# Patient Record
Sex: FEMALE
[De-identification: names, ages, dates, MRNs, and addresses within clinical notes are randomized per-mention and may not be internally consistent; named-entity substitution may affect disease eponyms.]

---

## 2022-04-25 ENCOUNTER — P2P PATIENT RECORD (OUTPATIENT)
Age: 47
End: 2022-04-25

## 2022-04-27 ENCOUNTER — TELEPHONE ENCOUNTER (OUTPATIENT)
Dept: URBAN - METROPOLITAN AREA CLINIC 86 | Facility: CLINIC | Age: 47
End: 2022-04-27

## 2022-05-05 ENCOUNTER — WEB ENCOUNTER (OUTPATIENT)
Dept: URBAN - METROPOLITAN AREA CLINIC 86 | Facility: CLINIC | Age: 47
End: 2022-05-05

## 2022-05-08 ENCOUNTER — TELEPHONE ENCOUNTER (OUTPATIENT)
Dept: URBAN - METROPOLITAN AREA CLINIC 92 | Facility: CLINIC | Age: 47
End: 2022-05-08

## 2022-05-08 NOTE — HPI-TODAY'S VISIT:
Patient is a 46-year-old female being referred by Dr. Shaun Alonzo for evaluation of abnormal immunologic markers specifically smooth muscle antibody positive study. A copy of the note will be sent to referring provider. Patient listed as being noted to have a history of goiter diagnosed in her 20s and being treated medications.  Noted to have now prediabetes with the last 2 years A1c at threshold of diabetes.  Polycystic ovary noted in the 20s.  Fatty liver noted in 2021.  Has been gaining weight for the last 5 years and has increased from 130-1 150-180 in 2017.  Peaking at 215 which is her current weight.  Has failed other efforts to lose weight.   Noted to have high liver labs and indeterminate fib 4 score.  AST listed as 151  platelets 345. A1c less than 5.7%. Also elicited with polycystic ovarian issues. 2022 labs showed ERNESTO speckled at a level of 160 homogeneous.  Prolactin normal at 18.1.  TSH 2.62.  DHEA morning.  Alpha-1 140..  CPK 80 683 inflammation grade of 0.67.  Suggestive of severe activity.  Cholesterol 211 triglyceride 84 HDL 57 glucose 111 BUN of 12 Kren 0.4 sodium 139 potassium 4.6 calcium 9.6 albumin 4.5 globin 0.6 alkaline phosphatase 65   F-actin +37.8 ASMA level of 20.  Vitamin D 35.  Hep B core total negative.  Testosterone 37.  AMA negative at 2.7.  b12 slightly up at 951.  Folate normal 10.9 uric acid 5.2.  Hep C antibody negative.  White cell count 9.9 hemoglobin 13.6 platelet count 391 hemoglobin A1c 6.0.  B surface antigen negative.  Ferritin 114.  IgG normal at 1245 IgM normal at 68 IgA slightly up at 578.  This would suggest this is a low positive immune marker due to the fatty liver and not primary immune issues. Hep B immunity seen but level not sent. Patient listed on a recent visit of 2022 as having a weight of 218.6 height of five-point 5-1/4 and a BMI of 36.1. Allergies listed as none.  Medicines include ibuprofen 800 mg up to every 8 hours as needed, B complex every day and vitamin D. Past history includes abnormal immune marker, sleep deprivation, simple goiter, suspected nonalcoholic fatty liver, prediabetes, hyperlipidemia, polycystic ovary syndrome, obesity, hirsutism, asthma, gluten sensitivity, and fatigue. Family history Father with glaucoma, macular degeneration, hyperlipidemia, hypertension.  Mother with congestive heart failure hyperlipidemia and hypertensive disorder. Social history patient does have approximately 5 drinks a week.  Not a smoker. Past surgical history  in 2008 and 2010.  1.  Abnormal liver labs.  Noted patient has gained significant mount of weight up to the highest weight yet.  He is prediabetic.  Has hyperlipidemia.  Has polycystic ovary which is an important risk factor for worsening fatty liver risk and Rosario as well.  Patient needs to work on each individual risk factor.  She is working out with Dr. Alonzo to try to address these factors and and see helps with both her polycystic ovary, the prediabetes and issues we will move forward in the right direction.   2.  Fatty liver suspected and needs imaging to reassess medications that could work for the same and Monitor accordingly.  Addressing nutritional risk factors will help prevent progression of disease.  Estimated by serologic score to have high activity and could have high risk to progress to fibrosis over time if not able to be addressed. 3.  Polycystic ovary syndrome noted.  Increases risk to have ROSARIO estimated at 3-5 fold and sometimes higher.  Needs to work on addressing the activity as is estimated to be high and has a high risk to progress to fibrosis of estimated 30% in 4 months working on same. 4.  Obesity noted needs to work on same.  May be difficult unless she is able to dress and other predisposing factors adding to this.  Losing even 5 to 10% of her overall health.  Exercise even walking 30 minutes a day can be helpful. 5.  Hyperlipidemia also needs to be addressed as this will also help with the fatty liver. 6.  Prediabetes again added factor to the same measures and all related to metabolic syndrome and insulin resistance.  These factors address should help with this as well. 7.  Asthma history noted as per team. 8.  Gluten sensitivity reported. 9.  Sleep deprivation history noted. 10.  Hirsutism being worked up by Dr. Alonzo. 11.  Abnormal immunologic markers likely related to false positive as IgG and IgM are normal.  Can be seen up to 40% of patients with fatty liver.  Plan 1.  Agree with Dr. Radford that the patient needs to look at all risk factors that she has not tried to address them. 2.  It is known the polycystic ovary patients have much higher risk for fatty liver. 3.  A combined approach of treating her prediabetes, polycystic ovary, weight loss as possible of even 5 to 10%, exercise daily, and healthy diet can all combine work on these issues. 4.  Emerging new classes of medicines, both the diabetes and prediabetes and the ROSARIO risk factors that could potentially be looked at both now and in the future is released. 5. Need liver imaging to assess status and correlate with serologic tests.  Stressed to pt the need for social distancing and strict handwashing and wearing a mask and to follow any other new or added CDC recommendations as this is an evolving target. Duration of the visit was 0 minutes with 20 minutes of chart prep and 20 minutes by dox video and then by phone due to internet issues by phone for this TeleMed visit with time reviewing their prior and recent records and labs and discussing their current status and future plans for care for the patient.

## 2022-05-12 ENCOUNTER — WEB ENCOUNTER (OUTPATIENT)
Dept: URBAN - METROPOLITAN AREA CLINIC 86 | Facility: CLINIC | Age: 47
End: 2022-05-12

## 2022-05-16 ENCOUNTER — LAB OUTSIDE AN ENCOUNTER (OUTPATIENT)
Dept: URBAN - METROPOLITAN AREA CLINIC 86 | Facility: CLINIC | Age: 47
End: 2022-05-16

## 2022-05-16 ENCOUNTER — OFFICE VISIT (OUTPATIENT)
Dept: URBAN - METROPOLITAN AREA CLINIC 86 | Facility: CLINIC | Age: 47
End: 2022-05-16
Payer: COMMERCIAL

## 2022-05-16 ENCOUNTER — WEB ENCOUNTER (OUTPATIENT)
Dept: URBAN - METROPOLITAN AREA CLINIC 86 | Facility: CLINIC | Age: 47
End: 2022-05-16

## 2022-05-16 DIAGNOSIS — Z71.89 VACCINE COUNSELING: ICD-10-CM

## 2022-05-16 DIAGNOSIS — E78.5 HYPERLIPIDEMIA: ICD-10-CM

## 2022-05-16 DIAGNOSIS — Z79.899 HIGH RISK MEDICATION USE: ICD-10-CM

## 2022-05-16 DIAGNOSIS — K76.0 FATTY LIVER: ICD-10-CM

## 2022-05-16 DIAGNOSIS — E66.9 OBESITY: ICD-10-CM

## 2022-05-16 DIAGNOSIS — K90.41 GLUTEN SENSITIVITY: ICD-10-CM

## 2022-05-16 DIAGNOSIS — J45.909 ASTHMA: ICD-10-CM

## 2022-05-16 DIAGNOSIS — R76.9 ABNORMAL IMMUNOLOGICAL FINDING IN SERUM: ICD-10-CM

## 2022-05-16 DIAGNOSIS — E04.0 SIMPLE GOITER: ICD-10-CM

## 2022-05-16 DIAGNOSIS — R73.03 PREDIABETES: ICD-10-CM

## 2022-05-16 DIAGNOSIS — E28.2 PCOS (POLYCYSTIC OVARIAN SYNDROME): ICD-10-CM

## 2022-05-16 DIAGNOSIS — R74.8 ABNORMAL LIVER ENZYMES: ICD-10-CM

## 2022-05-16 PROBLEM — 399939002 HIRSUTISM: Status: ACTIVE | Noted: 2022-05-08

## 2022-05-16 PROCEDURE — 99204 OFFICE O/P NEW MOD 45 MIN: CPT

## 2022-05-16 PROCEDURE — 99244 OFF/OP CNSLTJ NEW/EST MOD 40: CPT

## 2022-05-16 RX ORDER — B-COMPLEX WITH VITAMIN C
AS DIRECTED TABLET ORAL
Status: ACTIVE | COMMUNITY

## 2022-05-16 RX ORDER — IBUPROFEN 800 MG/1
1 TABLET WITH FOOD OR MILK AS NEEDED TABLET ORAL
Status: ACTIVE | COMMUNITY

## 2022-05-16 RX ORDER — CHOLECALCIFEROL (VITAMIN D3) 50 MCG
1 TABLET TABLET ORAL ONCE A DAY
Status: ACTIVE | COMMUNITY

## 2022-05-16 NOTE — HPI-TODAY'S VISIT:
Patient is a 46-year-old female being referred by Dr. Shaun Alonzo for evaluation of abnormal immunologic markers specifically smooth muscle antibody positive study in pt with suspected fatty liver and pcos.  A copy of the note will be sent to referring provider.  PCOS is a big risk factor for chen and for more aggressive chen.  Reviewed the notes: Patient listed as being noted to have a history of goiter diagnosed in her 20s and being treated medications.   Noted to have now prediabetes with the last 2 years A1c at threshold of diabetes.    Polycystic ovary noted in the 20s.    Fatty liver noted in 2021.    Has been gaining weight for the last 5 years and has increased from 130-150 to 180 in 2017.  Peaking at 215 which is her current weight with endocrine and 218 here  Had failed other efforts to lose weight.    Noted to have high liver labs and indeterminate fib 4 score.    AST listed as 151  platelets 345.  A1c less than 5.7%.  Also with polycystic ovarian issues and not on tx and she had this with getting pregnancy.  She had 2 kids and last one in . dropped to 150 after and then took off 2017 to now.  2022 labs showed ERNESTO speckled at a level of 160 homogeneous.  Prolactin normal at 18.1.  TSH 2.62.  DHEA morning.  Alpha-1 140.  CPK 80.  Serologic assay  inflammation grade of 0.67.  Suggestive of severe activity.   Cholesterol 211 triglyceride 84 HDL 57 glucose 111 BUN of 12 cr 0.4 sodium 139 potassium 4.6 calcium 9.6 albumin 4.5 globin 0.6 alkaline phosphatase 65   F-actin +37.8 ASMA level of 20.  Vitamin D 35.  Hep B core total negative.  Testosterone 37.  AMA negative at 2.7.  b12 slightly up at 951.  Folate normal 10.9 uric acid 5.2.  Hep C antibody negative.  White blood cell count 9.9 hemoglobin 13.6 platelet count 391 hemoglobin A1c 6.0.  B surface antigen negative.  Ferritin 114.  IgG normal at 1245 IgM normal at 68 IgA slightly up at 578.  This would suggest this is a low positive immune marker due to the fatty liver and not primary immune issues.  Hep B immunity seen but level not sent.  Patient listed on a recent visit of 2022 as having a weight of 218.6 height of five-point 5-1/4 and a BMI of 36.1.  Allergies listed as none.    Medicines include ibuprofen 800 mg up to every 8 hours as needed, B complex every day and vitamin D.  Not on any meds yet.  Past history includes abnormal immune marker, sleep deprivation, simple goiter, suspected nonalcoholic fatty liver, prediabetes, hyperlipidemia, polycystic ovary syndrome, obesity, hirsutism, asthma, gluten sensitivity, and fatigue.  Family history Father with glaucoma, macular degeneration, hyperlipidemia, hypertension.  Mother with congestive heart failure hyperlipidemia and hypertensive disorder.  Social history patient does have approximately 1-2 drinks a week and stopped.  Not a smoker.  2 kids.  Past surgical history  in 2008 and 2010.  Pt is on exercise and she says she walks 30 -40 min of walking.  Diet wise she is daily harvest and does smoothies and dinner with them.  Gave info re chen and med diets.  May need to get dietician.  Plan 1.  Agree with Dr. Radford that the patient needs to look at all risk factors that she has not tried to address them. 2.  It is known the polycystic ovary patients have much higher risk for fatty liver. 3.  A combined approach of treating her prediabetes, polycystic ovary, weight loss as possible of even 5 to 10%, staying exercise daily, and healthy diet can all combine work on these issues. 4.  Emerging new classes of medicines, both the diabetes and prediabetes and the CHEN risk factors that could potentially be looked at both now and in the future is released. 5. Need liver imaging to assess status and correlate with serologic tests. 6, gave her info to read re chen and med diket and pcos and liver.   Stressed to pt the need for social distancing and strict handwashing and wearing a mask and to follow any other new or added CDC recommendations as this is an evolving target.  Duration of the visit was 55 minutes with 25 minutes of chart prep and 30 minutes for the face to face visit with time reviewing their prior and recent records and labs and discussing their current status and future plans for care for the patient.

## 2022-05-16 NOTE — EXAM-PHYSICAL EXAM
Gen: awake and responsive. Eyes: anicteric, normal lids. Mouth: covered with mask. Nose: covered with mask. Hearing: intact grossly. Neck: trachea midline and no jvd. CV: RRR no s3. Lungs: clear. No wheezes, Abd: Soft, nabs, nr, NT. Not able to feel liver or spleen. Ext: no sig edema, slight palm erythema. Neuro: moves all 4 ext grossly. No asterixis. Skin: mentions some foot pruritis and slight palm erythema.

## 2022-05-17 ENCOUNTER — TELEPHONE ENCOUNTER (OUTPATIENT)
Dept: URBAN - METROPOLITAN AREA CLINIC 92 | Facility: CLINIC | Age: 47
End: 2022-05-17

## 2022-05-17 LAB
ALBUMIN: 4.3
ALKALINE PHOSPHATASE: 61
ALPHA-1-ANTITRYPSIN, SERUM: 147
ALT (SGPT): 66
AST (SGOT): 39
BILIRUBIN, DIRECT: 0.12
BILIRUBIN, TOTAL: 0.4
CERULOPLASMIN: 28.5
HBSAG SCREEN: NEGATIVE
HCV AB: <0.1
HEP A AB, TOTAL: NEGATIVE
HEP B CORE AB, TOT: NEGATIVE
HEPATITIS B SURF AB QUANT: 23.3
INTERPRETATION:: (no result)
IRON BIND.CAP.(TIBC): 331
IRON SATURATION: 16
IRON: 52
PROTEIN, TOTAL: 7.2
UIBC: 279

## 2022-05-17 NOTE — HPI-TODAY'S VISIT:
Dear Maribell Marinelli, May 16 labs show alpha-1 level normal at 147.  Ceruloplasmin normal at 28.5.  Hep C antibody negative at less than 0.1.   Hep A immunity not seen.  Hep B core total negative.  Hep B immunity was seen at 23.3. Hep B surface antigen negative.  Albumin 4.3 bilirubin 0.4 direct bilirubin 0.2 alkaline phosphatase 61. AST 39 and ALT 66 with ideal ALT less than 25.  Iron saturation normal at 16%. In summary, these laboratories did not find any additional issues but it does suggest that you need to get the hep A vaccine series.   The labs seen are consistent with a fatty liver. Very important as we discussed to see if Dr. Alonzo can find a medicine to help you with your prediabetic state that also helps with the fatty liver as many of these medicines again are emerging including newer ones that we will have specific indications for this in the next 1 to 2 years.   In the interim we need to continue your other efforts which you mentioned you were doing already. Hopefully we can find some additional options and ways to augment those efforts. Dr. Yanez

## 2022-06-15 ENCOUNTER — TELEPHONE ENCOUNTER (OUTPATIENT)
Dept: URBAN - METROPOLITAN AREA CLINIC 92 | Facility: CLINIC | Age: 47
End: 2022-06-15

## 2022-06-15 NOTE — HPI-TODAY'S VISIT:
Brian Marinelli, Elisa 15 ultrasound shows diffuse parenchymal echogenicity changes of liver but with no lesions. This would support a fatty liver as being likely present. No dilated bile ducts seen in common bile duct normal at 2 mm. Gallbladder was normal. Pancreas were visualized was unremarkable. Right kidney 12 cm left kidney 12.3 cm with no hydronephrosis. Spleen normal 11.3 cm. Liver vessels patent. Dr Yanez

## 2022-06-20 ENCOUNTER — TELEPHONE ENCOUNTER (OUTPATIENT)
Dept: URBAN - METROPOLITAN AREA CLINIC 86 | Facility: CLINIC | Age: 47
End: 2022-06-20

## 2022-07-15 ENCOUNTER — OFFICE VISIT (OUTPATIENT)
Dept: URBAN - METROPOLITAN AREA CLINIC 86 | Facility: CLINIC | Age: 47
End: 2022-07-15

## 2022-07-15 NOTE — HPI-TODAY'S VISIT:
Patient is a 46-year-old female last seen may 2022 and referred by Dr. Shaun Alonzo for evaluation of abnormal immunologic markers specifically smooth muscle antibody positive study in pt with suspected fatty liver and pcos.  A copy of the note will be sent to referring provider.  Dear Roya Marinelli, Elisa 15 ultrasound shows diffuse parenchymal echogenicity changes of liver but with no lesions. This would support a fatty liver as being likely present. No dilated bile ducts seen in common bile duct normal at 2 mm. Gallbladder was normal. Pancreas were visualized was unremarkable. Right kidney 12 cm left kidney 12.3 cm with no hydronephrosis. Spleen normal 11.3 cm. Liver vessels patent. Dr Yanez Dear Maribell Marinelli, May 16 labs show alpha-1 level normal at 147.  Ceruloplasmin normal at 28.5.  Hep C antibody negative at less than 0.1.   Hep A immunity not seen.  Hep B core total negative.  Hep B immunity was seen at 23.3. Hep B surface antigen negative.  Albumin 4.3 bilirubin 0.4 direct bilirubin 0.2 alkaline phosphatase 61. AST 39 and ALT 66 with ideal ALT less than 25.  Iron saturation normal at 16%. In summary, these laboratories did not find any additional issues but it does suggest that you need to get the hep A vaccine series.   The labs seen are consistent with a fatty liver. Very important as we discussed to see if Dr. Alonzo can find a medicine to help you with your prediabetic state that also helps with the fatty liver as many of these medicines again are emerging including newer ones that we will have specific indications for this in the next 1 to 2 years.   In the interim we need to continue your other efforts which you mentioned you were doing already. Hopefully we can find some additional options and ways to augment those efforts. Dr. Yanez  PCOS is a big risk factor for chen and for more aggressive chen.  Reviewed the notes: Patient listed as being noted to have a history of goiter diagnosed in her 20s and being treated medications.   Noted to have now prediabetes with the last 2 years A1c at threshold of diabetes.    Polycystic ovary noted in the 20s.    Fatty liver noted in 2021.    Has been gaining weight for the last 5 years and has increased from 130-150 to 180 in 2017.  Peaking at 215 which is her current weight with endocrine and 218 here  Had failed other efforts to lose weight.    Noted to have high liver labs and indeterminate fib 4 score.    AST listed as 151  platelets 345.  A1c less than 5.7%.  Also with polycystic ovarian issues and not on tx and she had this with getting pregnancy.  She had 2 kids and last one in . dropped to 150 after and then took off 2017 to now.  2022 labs showed ERNESTO speckled at a level of 160 homogeneous.  Prolactin normal at 18.1.  TSH 2.62.  DHEA morning.  Alpha-1 140.  CPK 80.  Serologic assay  inflammation grade of 0.67.  Suggestive of severe activity.   Cholesterol 211 triglyceride 84 HDL 57 glucose 111 BUN of 12 cr 0.4 sodium 139 potassium 4.6 calcium 9.6 albumin 4.5 globin 0.6 alkaline phosphatase 65   F-actin +37.8 ASMA level of 20.  Vitamin D 35.  Hep B core total negative.  Testosterone 37.  AMA negative at 2.7.  b12 slightly up at 951.  Folate normal 10.9 uric acid 5.2.  Hep C antibody negative.  White blood cell count 9.9 hemoglobin 13.6 platelet count 391 hemoglobin A1c 6.0.  B surface antigen negative.  Ferritin 114.  IgG normal at 1245 IgM normal at 68 IgA slightly up at 578.  This would suggest this is a low positive immune marker due to the fatty liver and not primary immune issues.  Hep B immunity seen but level not sent.  Patient listed on a recent visit of 2022 as having a weight of 218.6 height of five-point 5-1/4 and a BMI of 36.1.  Allergies listed as none.    Medicines include ibuprofen 800 mg up to every 8 hours as needed, B complex every day and vitamin D.  Not on any meds yet.  Past history includes abnormal immune marker, sleep deprivation, simple goiter, suspected nonalcoholic fatty liver, prediabetes, hyperlipidemia, polycystic ovary syndrome, obesity, hirsutism, asthma, gluten sensitivity, and fatigue.  Family history Father with glaucoma, macular degeneration, hyperlipidemia, hypertension.  Mother with congestive heart failure hyperlipidemia and hypertensive disorder.  Social history patient does have approximately 1-2 drinks a week and stopped.  Not a smoker.  2 kids.  Past surgical history  in 2008 and 2010.  Pt is on exercise and she says she walks 30 -40 min of walking.  Diet wise she is daily harvest and does smoothies and dinner with them.  Gave info re chen and med diets.  May need to get dietician.  Plan 1.  Agree with Dr. Radford that the patient needs to look at all risk factors that she has not tried to address them. 2.  It is known the polycystic ovary patients have much higher risk for fatty liver. 3.  A combined approach of treating her prediabetes, polycystic ovary, weight loss as possible of even 5 to 10%, staying exercise daily, and healthy diet can all combine work on these issues. 4.  Emerging new classes of medicines, both the diabetes and prediabetes and the CHEN risk factors that could potentially be looked at both now and in the future is released. 5. Need liver imaging to assess status and correlate with serologic tests. 6, gave her info to read re chen and med diket and pcos and liver.   Stressed to pt the need for social distancing and strict handwashing and wearing a mask and to follow any other new or added CDC recommendations as this is an evolving target.  Duration of the visit was  minutes with 25 minutes of chart prep and 30 minutes for the face to face visit with time reviewing their prior and recent records and labs and discussing their current status and future plans for care for the patient.

## 2022-07-16 ENCOUNTER — LAB OUTSIDE AN ENCOUNTER (OUTPATIENT)
Dept: URBAN - METROPOLITAN AREA CLINIC 86 | Facility: CLINIC | Age: 47
End: 2022-07-16

## 2022-07-31 PROBLEM — 441831003 GLUTEN SENSITIVITY: Status: ACTIVE | Noted: 2022-05-08

## 2022-07-31 PROBLEM — 195967001 ASTHMA: Status: ACTIVE | Noted: 2022-05-08

## 2022-07-31 PROBLEM — 267369002 SIMPLE GOITER: Status: ACTIVE | Noted: 2022-05-08

## 2022-08-10 ENCOUNTER — WEB ENCOUNTER (OUTPATIENT)
Dept: URBAN - METROPOLITAN AREA CLINIC 86 | Facility: CLINIC | Age: 47
End: 2022-08-10

## 2022-08-15 ENCOUNTER — LAB OUTSIDE AN ENCOUNTER (OUTPATIENT)
Dept: URBAN - METROPOLITAN AREA CLINIC 86 | Facility: CLINIC | Age: 47
End: 2022-08-15

## 2022-08-16 ENCOUNTER — CLAIMS CREATED FROM THE CLAIM WINDOW (OUTPATIENT)
Dept: URBAN - METROPOLITAN AREA CLINIC 86 | Facility: CLINIC | Age: 47
End: 2022-08-16
Payer: COMMERCIAL

## 2022-08-16 ENCOUNTER — TELEPHONE ENCOUNTER (OUTPATIENT)
Dept: URBAN - METROPOLITAN AREA CLINIC 92 | Facility: CLINIC | Age: 47
End: 2022-08-16

## 2022-08-16 VITALS
WEIGHT: 198 LBS | DIASTOLIC BLOOD PRESSURE: 78 MMHG | TEMPERATURE: 96.9 F | HEIGHT: 65 IN | BODY MASS INDEX: 32.99 KG/M2 | HEART RATE: 91 BPM | SYSTOLIC BLOOD PRESSURE: 117 MMHG

## 2022-08-16 DIAGNOSIS — E28.2 PCOS (POLYCYSTIC OVARIAN SYNDROME): ICD-10-CM

## 2022-08-16 DIAGNOSIS — Z72.820 SLEEP DEPRIVATION: ICD-10-CM

## 2022-08-16 DIAGNOSIS — E66.9 OBESITY: ICD-10-CM

## 2022-08-16 DIAGNOSIS — K76.0 FATTY LIVER: ICD-10-CM

## 2022-08-16 DIAGNOSIS — Z79.899 HIGH RISK MEDICATION USE: ICD-10-CM

## 2022-08-16 DIAGNOSIS — R76.9 ABNORMAL IMMUNOLOGICAL FINDING IN SERUM: ICD-10-CM

## 2022-08-16 DIAGNOSIS — Z71.89 VACCINE COUNSELING: ICD-10-CM

## 2022-08-16 DIAGNOSIS — R74.8 ABNORMAL LIVER ENZYMES: ICD-10-CM

## 2022-08-16 DIAGNOSIS — R73.03 PREDIABETES: ICD-10-CM

## 2022-08-16 DIAGNOSIS — E78.5 HYPERLIPIDEMIA: ICD-10-CM

## 2022-08-16 LAB
ALBUMIN: 4.7
ALKALINE PHOSPHATASE: 54
ALT (SGPT): 32
AST (SGOT): 23
BILIRUBIN, DIRECT: 0.12
BILIRUBIN, TOTAL: 0.6
PROTEIN, TOTAL: 7.3

## 2022-08-16 PROCEDURE — 99214 OFFICE O/P EST MOD 30 MIN: CPT | Performed by: PHYSICIAN ASSISTANT

## 2022-08-16 PROCEDURE — 99214 OFFICE O/P EST MOD 30 MIN: CPT

## 2022-08-16 RX ORDER — CHOLECALCIFEROL (VITAMIN D3) 50 MCG
1 TABLET TABLET ORAL ONCE A DAY
Status: ACTIVE | COMMUNITY

## 2022-08-16 RX ORDER — IBUPROFEN 800 MG/1
1 TABLET WITH FOOD OR MILK AS NEEDED TABLET ORAL
Status: ACTIVE | COMMUNITY

## 2022-08-16 RX ORDER — B-COMPLEX WITH VITAMIN C
AS DIRECTED TABLET ORAL
Status: ACTIVE | COMMUNITY

## 2022-08-16 NOTE — PHYSICAL EXAM HENT:
Head,  normocephalic,  atraumatic,  Face,  Face within normal limits,  Ears,  External ears within normal limits,  Nose/Nasopharynx and mouth: patient wearing mask
no

## 2022-08-16 NOTE — HPI-TODAY'S VISIT:
Patient is a 46-year-old female last seen may 2022 and referred by Dr. Shaun Alonzo for evaluation of abnormal immunologic markers specifically smooth muscle antibody positive study in pt with suspected fatty liver and pcos.  A copy of the note will be sent to referring provider.   office visit:   Elisa 15 ultrasound shows diffuse parenchymal echogenicity changes of liver but with no lesions. This would support a fatty liver as being likely present. No dilated bile ducts seen in common bile duct normal at 2 mm. Gallbladder was normal. Pancreas were visualized was unremarkable. Right kidney 12 cm left kidney 12.3 cm with no hydronephrosis. Spleen normal 11.3 cm. Liver vessels patent.  Still seeing Emperatriz. has lost 20 lbs on Saxenda. Encrouged.   Watching diet and exercise.  Dr. Awan did labs end of july and she said AST 21 and ALT 24 ,Alkaline phosphatase 52 She did labs for us yesterday through [facility]. Will try to get.   A1c: 5.6 in  prior to weight loss  Cholesterol: not checked recently. recommend getting at pcp   Sleep: sleeping much better.  RECAP previous visits:  May 16 labs show alpha-1 level normal at 147.  Ceruloplasmin normal at 28.5.  Hep C antibody negative at less than 0.1.   Hep A immunity not seen.  Hep B core total negative.  Hep B immunity was seen at 23.3. Hep B surface antigen negative.  Albumin 4.3 bilirubin 0.4 direct bilirubin 0.2 alkaline phosphatase 61. AST 39 and ALT 66 with ideal ALT less than 25.  Iron saturation normal at 16%.  Patient listed as being noted to have a history of goiter diagnosed in her 20s and being treated medications.   Noted to have now prediabetes with the last 2 years A1c at threshold of diabetes.    Polycystic ovary noted in the 20s.    Fatty liver noted in 2021.    Has been gaining weight for the last 5 years and has increased from 130-150 to 180 in 2017.  Peaking at 215 which is her current weight with endocrine and 218 here  Had failed other efforts to lose weight.    Noted to have high liver labs and indeterminate fib 4 score.    AST listed as 151  platelets 345.  A1c less than 5.7%.  Also with polycystic ovarian issues and not on tx and she had this with getting pregnancy.  She had 2 kids and last one in . dropped to 150 after and then took off 2017 to now.  2022 labs showed ERNESTO speckled at a level of 160 homogeneous.  Prolactin normal at 18.1.  TSH 2.62.  DHEA morning.  Alpha-1 140.  CPK 80.  Serologic assay  inflammation grade of 0.67.  Suggestive of severe activity.   Cholesterol 211 triglyceride 84 HDL 57 glucose 111 BUN of 12 cr 0.4 sodium 139 potassium 4.6 calcium 9.6 albumin 4.5 globin 0.6 alkaline phosphatase 65   F-actin +37.8 ASMA level of 20.  Vitamin D 35.  Hep B core total negative.  Testosterone 37.  AMA negative at 2.7.  b12 slightly up at 951.  Folate normal 10.9 uric acid 5.2.  Hep C antibody negative.  White blood cell count 9.9 hemoglobin 13.6 platelet count 391 hemoglobin A1c 6.0.  B surface antigen negative.  Ferritin 114.  IgG normal at 1245 IgM normal at 68 IgA slightly up at 578.  This would suggest this is a low positive immune marker due to the fatty liver and not primary immune issues.  Hep B immunity seen but level not sent.  Patient listed on a recent visit of 2022 as having a weight of 218.6 height of five-point 5-1/4 and a BMI of 36.1.  Allergies listed as none.    Medicines include ibuprofen 800 mg up to every 8 hours as needed, B complex every day and vitamin D.  Not on any meds yet.  Past history includes abnormal immune marker, sleep deprivation, simple goiter, suspected nonalcoholic fatty liver, prediabetes, hyperlipidemia, polycystic ovary syndrome, obesity, hirsutism, asthma, gluten sensitivity, and fatigue.  Family history Father with glaucoma, macular degeneration, hyperlipidemia, hypertension.  Mother with congestive heart failure hyperlipidemia and hypertensive disorder.  Social history patient does have approximately 1-2 drinks a week and stopped.  Not a smoker.  2 kids.  Past surgical history  in 2008 and 2010.  Pt is on exercise and she says she walks 30 -40 min of walking.  Diet wise she is daily harvest and does smoothies and dinner with them.  Gave info re chen and med diets.  May need to get dietician.

## 2022-08-16 NOTE — HPI-TODAY'S VISIT:
Dear Maribell Marinelli, Aug 15 labs show tp 7.3 and alb 4.7 and tb 0.6 and db 0.12 and alk 54 and ast 23 and alt 32. These are down from may 16: ast 39 and alt 66 so you are doing better! Ideal alt less than 25. Dr Yanez

## 2022-11-15 ENCOUNTER — LAB OUTSIDE AN ENCOUNTER (OUTPATIENT)
Dept: URBAN - METROPOLITAN AREA CLINIC 86 | Facility: CLINIC | Age: 47
End: 2022-11-15

## 2022-12-01 ENCOUNTER — LAB OUTSIDE AN ENCOUNTER (OUTPATIENT)
Dept: URBAN - METROPOLITAN AREA CLINIC 86 | Facility: CLINIC | Age: 47
End: 2022-12-01

## 2022-12-02 LAB
A/G RATIO: 1.7
ALBUMIN: 4.8
ALKALINE PHOSPHATASE: 50
ALT (SGPT): 11
AST (SGOT): 13
BILIRUBIN, TOTAL: 0.3
BUN/CREATININE RATIO: 6
BUN: 6
CALCIUM: 9.7
CARBON DIOXIDE, TOTAL: 23
CHLORIDE: 99
CREATININE: 0.96
EGFR: 74
GLOBULIN, TOTAL: 2.8
GLUCOSE: 94
POTASSIUM: 4.3
PROTEIN, TOTAL: 7.6
SODIUM: 138

## 2022-12-03 ENCOUNTER — TELEPHONE ENCOUNTER (OUTPATIENT)
Dept: URBAN - METROPOLITAN AREA CLINIC 92 | Facility: CLINIC | Age: 47
End: 2022-12-03

## 2022-12-03 NOTE — HPI-TODAY'S VISIT:
Brian Marinelli, December 2 ultrasound showed liver to have an increased echogenicity.  There were likely focal sparing areas of fatty liver around the gallbladder fossa suspected. Bile duct showed no dilation and common bile duct was normal at 2.5 mm. Gallbladder showed a 2 x 3 x 4 mm echogenic focus in the gallbladder neck.  Slater sign was negative. Pancreas was unremarkable were seen. Right kidney was 12.5 cm and left kidney 11.8 cm with no hydronephrosis. Spleen was 10.7 cm. Liver vessels were patent. Overall increased echogenicity of the liver was seen due to fatty liver. The portal vein demonstrated normal directional flow. A 4 mm echogenic area was seen in the gallbladder neck which they thought could represent a stone versus a polyp. Possible focal fat sparing was seen around the gallbladder fossa area. If one compares this ultrasound to your Elisa 15 ultrasound, it also showed diffuse parenchymal echogenic changes of the liver but no focal far sparing lesions were suggested.  It did not mention any focal fat sparing, I do believe that this would warrant us to consider an MRI to get a better look and to confirm this is the case.  I see that you have an upcoming appointment on December 7 with Jessica and at that point you and she can review your labs and this and if everything is stable plan to get an MRI to get a better look at the liver fat, and to confirm that this finding was just focal fat sparing as suspected by the radiologist. Dr. Yanez

## 2022-12-07 ENCOUNTER — LAB OUTSIDE AN ENCOUNTER (OUTPATIENT)
Dept: URBAN - METROPOLITAN AREA CLINIC 86 | Facility: CLINIC | Age: 47
End: 2022-12-07

## 2022-12-07 ENCOUNTER — CLAIMS CREATED FROM THE CLAIM WINDOW (OUTPATIENT)
Dept: URBAN - METROPOLITAN AREA CLINIC 86 | Facility: CLINIC | Age: 47
End: 2022-12-07
Payer: COMMERCIAL

## 2022-12-07 ENCOUNTER — WEB ENCOUNTER (OUTPATIENT)
Dept: URBAN - METROPOLITAN AREA CLINIC 86 | Facility: CLINIC | Age: 47
End: 2022-12-07

## 2022-12-07 VITALS
BODY MASS INDEX: 27.99 KG/M2 | WEIGHT: 168 LBS | HEIGHT: 65 IN | TEMPERATURE: 97 F | DIASTOLIC BLOOD PRESSURE: 69 MMHG | SYSTOLIC BLOOD PRESSURE: 98 MMHG | HEART RATE: 87 BPM

## 2022-12-07 DIAGNOSIS — R73.03 PREDIABETES: ICD-10-CM

## 2022-12-07 DIAGNOSIS — Z79.899 HIGH RISK MEDICATION USE: ICD-10-CM

## 2022-12-07 DIAGNOSIS — R93.5 ABNORMAL US (ULTRASOUND) OF ABDOMEN: ICD-10-CM

## 2022-12-07 DIAGNOSIS — E66.9 OBESITY: ICD-10-CM

## 2022-12-07 DIAGNOSIS — K76.0 FATTY LIVER: ICD-10-CM

## 2022-12-07 DIAGNOSIS — R76.9 ABNORMAL IMMUNOLOGICAL FINDING IN SERUM: ICD-10-CM

## 2022-12-07 DIAGNOSIS — E78.5 HYPERLIPIDEMIA: ICD-10-CM

## 2022-12-07 DIAGNOSIS — Z71.89 VACCINE COUNSELING: ICD-10-CM

## 2022-12-07 DIAGNOSIS — E28.2 PCOS (POLYCYSTIC OVARIAN SYNDROME): ICD-10-CM

## 2022-12-07 DIAGNOSIS — R74.8 ABNORMAL LIVER ENZYMES: ICD-10-CM

## 2022-12-07 DIAGNOSIS — Z72.820 SLEEP DEPRIVATION: ICD-10-CM

## 2022-12-07 PROBLEM — 409063005 COUNSELING: Status: ACTIVE | Noted: 2022-05-08

## 2022-12-07 PROBLEM — 161646004 H/O: HIGH RISK MEDICATION: Status: ACTIVE | Noted: 2022-05-08

## 2022-12-07 PROBLEM — 55822004 HYPERLIPIDEMIA: Status: ACTIVE | Noted: 2022-05-08

## 2022-12-07 PROBLEM — 15633921000119109: Status: ACTIVE | Noted: 2022-12-07

## 2022-12-07 PROBLEM — 69878008 POLYCYSTIC OVARIES: Status: ACTIVE | Noted: 2022-05-08

## 2022-12-07 PROBLEM — 130989002 SLEEP DEPRIVATION: Status: ACTIVE | Noted: 2022-05-08

## 2022-12-07 PROBLEM — 714628002 PREDIABETES: Status: ACTIVE | Noted: 2022-05-08

## 2022-12-07 PROBLEM — 414916001 OBESITY: Status: ACTIVE | Noted: 2022-05-08

## 2022-12-07 PROBLEM — 197321007 FATTY LIVER: Status: ACTIVE | Noted: 2022-05-08

## 2022-12-07 PROBLEM — 166643006 LIVER ENZYMES ABNORMAL: Status: ACTIVE | Noted: 2022-05-08

## 2022-12-07 PROCEDURE — 99214 OFFICE O/P EST MOD 30 MIN: CPT | Performed by: PHYSICIAN ASSISTANT

## 2022-12-07 RX ORDER — SEMAGLUTIDE 2.4 MG/.75ML
INJECT 2.4 MG EVERY WEEK BY SUBCUTANEOUS ROUTE INJECTION, SOLUTION SUBCUTANEOUS
Qty: 9 MILLILITER | Refills: 0 | Status: ACTIVE | COMMUNITY

## 2022-12-07 RX ORDER — SPIRONOLACTONE 50 MG/1
TABLET ORAL
Qty: 60 TABLET | Status: ACTIVE | COMMUNITY

## 2022-12-07 RX ORDER — URSODIOL 250 MG/1
TAKE 1 TABLET BY MOUTH TWICE A DAY TABLET ORAL
Qty: 180 EACH | Refills: 1 | Status: ACTIVE | COMMUNITY

## 2022-12-07 RX ORDER — B-COMPLEX WITH VITAMIN C
AS DIRECTED TABLET ORAL
Status: ACTIVE | COMMUNITY

## 2022-12-07 RX ORDER — CHOLECALCIFEROL (VITAMIN D3) 50 MCG
1 TABLET TABLET ORAL ONCE A DAY
Status: ACTIVE | COMMUNITY

## 2022-12-07 RX ORDER — IBUPROFEN 800 MG/1
1 TABLET WITH FOOD OR MILK AS NEEDED TABLET ORAL
Status: ON HOLD | COMMUNITY

## 2022-12-07 NOTE — HPI-TODAY'S VISIT:
Patient is a 46-year-old female last seen may 2022 and referred by Dr. Shaun Alonzo for evaluation of abnormal immunologic markers specifically smooth muscle antibody positive study in pt with suspected fatty liver and pcos.  A copy of the note will be sent to referring provider.  22 office visit  ultrasound showed liver to have an increased echogenicity.  There were likely focal sparing areas of fatty liver around the gallbladder fossa suspected. Bile duct showed no dilation and common bile duct was normal at 2.5 mm. Gallbladder showed a 2 x 3 x 4 mm echogenic focus in the gallbladder neck.  Slater sign was negative. Pancreas was unremarkable were seen. Right kidney was 12.5 cm and left kidney 11.8 cm with no hydronephrosis. Spleen was 10.7 cm. Liver vessels were patent. Overall increased echogenicity of the liver was seen due to fatty liver. The portal vein demonstrated normal directional flow. A 4 mm echogenic area was seen in the gallbladder neck which they thought could represent a stone versus a polyp. Possible focal fat sparing was seen around the gallbladder fossa area. If one compares this ultrasound to your Elisa 15 ultrasound, it also showed diffuse parenchymal echogenic changes of the liver but no focal far sparing lesions were suggested.  It did not mention any focal fat sparing, I do believe that this would warrant us to consider an MRI to get a better look and to confirm this is the case.  168 now and previously 198 and before that 220 and doing well more disipline and wathcing diet and less stress and more sleep  Labs ast 13, alt 11 and doing much better Patient is working dr awan on a monthly basis to help with the weight loss and is on ursodiol to help with the stones She is also on the wegovy with him and doing well  sleep was better too , not snoring now as well which is good to note   RECAP Elisa 15 ultrasound shows diffuse parenchymal echogenicity changes of liver but with no lesions. This would support a fatty liver as being likely present. No dilated bile ducts seen in common bile duct normal at 2 mm. Gallbladder was normal. Pancreas were visualized was unremarkable. Right kidney 12 cm left kidney 12.3 cm with no hydronephrosis. Spleen normal 11.3 cm. Liver vessels patent.  Still seeing Emperatriz. has lost 20 lbs on Saxenda. Encrouged.   Watching diet and exercise.  Dr. Awan did labs end of july and she said AST 21 and ALT 24 ,Alkaline phosphatase 52 She did labs for us yesterday through lab [facility]. Will try to get.   A1c: 5.6 in  prior to weight loss  Cholesterol: not checked recently. recommend getting at pcp   Sleep: sleeping much better.  RECAP previous visits:  May 16 labs show alpha-1 level normal at 147.  Ceruloplasmin normal at 28.5.  Hep C antibody negative at less than 0.1.   Hep A immunity not seen.  Hep B core total negative.  Hep B immunity was seen at 23.3. Hep B surface antigen negative.  Albumin 4.3 bilirubin 0.4 direct bilirubin 0.2 alkaline phosphatase 61. AST 39 and ALT 66 with ideal ALT less than 25.  Iron saturation normal at 16%.  Patient listed as being noted to have a history of goiter diagnosed in her 20s and being treated medications.   Noted to have now prediabetes with the last 2 years A1c at threshold of diabetes.    Polycystic ovary noted in the 20s.    Fatty liver noted in 2021.    Has been gaining weight for the last 5 years and has increased from 130-150 to 180 in 2017.  Peaking at 215 which is her current weight with endocrine and 218 here  Had failed other efforts to lose weight.    Noted to have high liver labs and indeterminate fib 4 score.    AST listed as 151  platelets 345.  A1c less than 5.7%.  Also with polycystic ovarian issues and not on tx and she had this with getting pregnancy.  She had 2 kids and last one in . dropped to 150 after and then took off 2017 to now.  2022 labs showed ERNESTO speckled at a level of 160 homogeneous.  Prolactin normal at 18.1.  TSH 2.62.  DHEA morning.  Alpha-1 140.  CPK 80.  Serologic assay  inflammation grade of 0.67.  Suggestive of severe activity.   Cholesterol 211 triglyceride 84 HDL 57 glucose 111 BUN of 12 cr 0.4 sodium 139 potassium 4.6 calcium 9.6 albumin 4.5 globin 0.6 alkaline phosphatase 65   F-actin +37.8 ASMA level of 20.  Vitamin D 35.  Hep B core total negative.  Testosterone 37.  AMA negative at 2.7.  b12 slightly up at 951.  Folate normal 10.9 uric acid 5.2.  Hep C antibody negative.  White blood cell count 9.9 hemoglobin 13.6 platelet count 391 hemoglobin A1c 6.0.  B surface antigen negative.  Ferritin 114.  IgG normal at 1245 IgM normal at 68 IgA slightly up at 578.  This would suggest this is a low positive immune marker due to the fatty liver and not primary immune issues.  Hep B immunity seen but level not sent.  Patient listed on a recent visit of 2022 as having a weight of 218.6 height of five-point 5-1/4 and a BMI of 36.1.  Allergies listed as none.    Medicines include ibuprofen 800 mg up to every 8 hours as needed, B complex every day and vitamin D.  Not on any meds yet.  Past history includes abnormal immune marker, sleep deprivation, simple goiter, suspected nonalcoholic fatty liver, prediabetes, hyperlipidemia, polycystic ovary syndrome, obesity, hirsutism, asthma, gluten sensitivity, and fatigue.  Family history Father with glaucoma, macular degeneration, hyperlipidemia, hypertension.  Mother with congestive heart failure hyperlipidemia and hypertensive disorder.  Social history patient does have approximately 1-2 drinks a week and stopped.  Not a smoker.  2 kids.  Past surgical history  in 2008 and 2010.  Pt is on exercise and she says she walks 30 -40 min of walking.  Diet wise she is daily harvest and does smoothies and dinner with them.  Gave info re chen and med diets.  May need to get dietician.

## 2022-12-15 ENCOUNTER — LAB OUTSIDE AN ENCOUNTER (OUTPATIENT)
Dept: URBAN - METROPOLITAN AREA CLINIC 86 | Facility: CLINIC | Age: 47
End: 2022-12-15

## 2023-02-01 ENCOUNTER — LAB OUTSIDE AN ENCOUNTER (OUTPATIENT)
Dept: URBAN - METROPOLITAN AREA CLINIC 86 | Facility: CLINIC | Age: 48
End: 2023-02-01

## 2023-02-03 ENCOUNTER — WEB ENCOUNTER (OUTPATIENT)
Dept: URBAN - METROPOLITAN AREA CLINIC 86 | Facility: CLINIC | Age: 48
End: 2023-02-03

## 2023-03-07 ENCOUNTER — LAB OUTSIDE AN ENCOUNTER (OUTPATIENT)
Dept: URBAN - METROPOLITAN AREA CLINIC 86 | Facility: CLINIC | Age: 48
End: 2023-03-07

## 2023-03-08 ENCOUNTER — TELEPHONE ENCOUNTER (OUTPATIENT)
Dept: URBAN - METROPOLITAN AREA CLINIC 86 | Facility: CLINIC | Age: 48
End: 2023-03-08

## 2023-03-08 LAB
A/G RATIO: 1.8
ALBUMIN: 4.7
ALKALINE PHOSPHATASE: 49
ALT (SGPT): 7
AST (SGOT): 13
BASO (ABSOLUTE): 0
BASOS: 1
BILIRUBIN, TOTAL: 0.6
BUN/CREATININE RATIO: 12
BUN: 10
CALCIUM: 9.6
CARBON DIOXIDE, TOTAL: 25
CHLORIDE: 100
CREATININE: 0.84
EGFR: 87
EOS (ABSOLUTE): 0.1
EOS: 1
GLOBULIN, TOTAL: 2.6
GLUCOSE: 87
HEMATOCRIT: 40.6
HEMATOLOGY COMMENTS:: (no result)
HEMOGLOBIN: 13.4
IMMATURE CELLS: (no result)
IMMATURE GRANS (ABS): 0
IMMATURE GRANULOCYTES: 0
LYMPHS (ABSOLUTE): 2.7
LYMPHS: 34
MCH: 29.8
MCHC: 33
MCV: 90
MONOCYTES(ABSOLUTE): 0.7
MONOCYTES: 9
NEUTROPHILS (ABSOLUTE): 4.4
NEUTROPHILS: 55
NRBC: (no result)
PLATELETS: 418
POTASSIUM: 4
PROTEIN, TOTAL: 7.3
RBC: 4.49
RDW: 12.2
SODIUM: 138
WBC: 7.9

## 2023-03-08 NOTE — HPI-TODAY'S VISIT:
Dear Maribell Marinelli,  The 3/7/23 labs were sent to me.  The complete blood count was normal.  The Alkaline phosphatase 49, ast 13, alt 7. Previously ast 13, alt 11. Glad to see the las are staying low and stable. The kidney function and electrolytes are normal.  Yenni Pierson PA-C

## 2023-03-13 ENCOUNTER — OFFICE VISIT (OUTPATIENT)
Dept: URBAN - METROPOLITAN AREA CLINIC 86 | Facility: CLINIC | Age: 48
End: 2023-03-13
Payer: COMMERCIAL

## 2023-03-13 VITALS
DIASTOLIC BLOOD PRESSURE: 72 MMHG | HEIGHT: 65 IN | HEART RATE: 78 BPM | TEMPERATURE: 97.2 F | BODY MASS INDEX: 26.74 KG/M2 | SYSTOLIC BLOOD PRESSURE: 102 MMHG | WEIGHT: 160.5 LBS

## 2023-03-13 DIAGNOSIS — R73.03 PREDIABETES: ICD-10-CM

## 2023-03-13 DIAGNOSIS — E28.2 PCOS (POLYCYSTIC OVARIAN SYNDROME): ICD-10-CM

## 2023-03-13 DIAGNOSIS — R93.5 ABNORMAL US (ULTRASOUND) OF ABDOMEN: ICD-10-CM

## 2023-03-13 DIAGNOSIS — Z72.820 SLEEP DEPRIVATION: ICD-10-CM

## 2023-03-13 DIAGNOSIS — R74.8 ABNORMAL LIVER ENZYMES: ICD-10-CM

## 2023-03-13 DIAGNOSIS — E66.9 OBESITY: ICD-10-CM

## 2023-03-13 DIAGNOSIS — K76.0 FATTY LIVER: ICD-10-CM

## 2023-03-13 DIAGNOSIS — Z79.899 HIGH RISK MEDICATION USE: ICD-10-CM

## 2023-03-13 DIAGNOSIS — R76.9 ABNORMAL IMMUNOLOGICAL FINDING IN SERUM: ICD-10-CM

## 2023-03-13 DIAGNOSIS — E78.5 HYPERLIPIDEMIA: ICD-10-CM

## 2023-03-13 DIAGNOSIS — Z71.89 VACCINE COUNSELING: ICD-10-CM

## 2023-03-13 PROCEDURE — 99214 OFFICE O/P EST MOD 30 MIN: CPT | Performed by: PHYSICIAN ASSISTANT

## 2023-03-13 RX ORDER — CHOLECALCIFEROL (VITAMIN D3) 50 MCG
1 TABLET TABLET ORAL ONCE A DAY
Status: ACTIVE | COMMUNITY

## 2023-03-13 RX ORDER — SEMAGLUTIDE 2.4 MG/.75ML
INJECT 2.4 MG EVERY WEEK BY SUBCUTANEOUS ROUTE INJECTION, SOLUTION SUBCUTANEOUS
Qty: 9 MILLILITER | Refills: 0 | Status: ACTIVE | COMMUNITY

## 2023-03-13 RX ORDER — B-COMPLEX WITH VITAMIN C
AS DIRECTED TABLET ORAL
Status: ACTIVE | COMMUNITY

## 2023-03-13 RX ORDER — IBUPROFEN 800 MG/1
1 TABLET WITH FOOD OR MILK AS NEEDED TABLET ORAL
Status: ON HOLD | COMMUNITY

## 2023-03-13 RX ORDER — SPIRONOLACTONE 50 MG/1
TABLET ORAL
Qty: 60 TABLET | Status: ACTIVE | COMMUNITY

## 2023-03-13 RX ORDER — URSODIOL 250 MG/1
TAKE 1 TABLET BY MOUTH TWICE A DAY TABLET ORAL
Qty: 180 EACH | Refills: 1 | Status: ACTIVE | COMMUNITY

## 2023-03-13 NOTE — PHYSICAL EXAM NEUROLOGIC:
short and long term memory intact  64 year old male h/o GI bleed, diverticulosis with colonoscopy and clipping in 2013 who currently presents with GI bleed s/p CTA of abdomen found to have small arterial bleeds in sigmoid colon and rectum.     PLAN:     Neurologic: AAO x4, mentating well; continue to monitor  Respiratory: Adequate oxygenation; saturating well on room air, will maintain SPO2 >92%  Cardiovascular: Adequately perfused. Hemodynamically stable. C/w IVF w/ LR @ 125 cc/hr.  Gastrointestinal/Nutrition: CTA showed arterial blushes in rectum and sigmoid colon. Patient to go for colonoscopy today.  Renal/Genitourinary: BUN/Cr stable. Monitor UOP. Ins and Outs. Hyponatremia 2/2 to GI losses - trend BMP. C/w IVF LR @125cc/hr.  Hematologic: H/H stable. Trend CBC, transfuse PRN for Hgb goal >7.0. Coags wnl. Mechanical VTE ppx in setting of active bleeding.   Infectious Disease: Afebrile. No leukocytosis. Monitor off abx for now.  Tubes/Lines/Drains: PIV  Endocrine: Blood glucose wnl. Monitor FS.  Disposition: SICU    --------------------------------------------------------------------------------------    Critical Care Diagnoses: Gastrointestinal bleeding, Hyponatremia 64 year old male h/o GI bleed, diverticulosis with colonoscopy and clipping in 2013 who currently presents with GI bleed s/p CTA of abdomen found to have small arterial bleeds in sigmoid colon and rectum.     PLAN:     Neurologic: AAO x4, mentating well; continue to monitor  Respiratory: Adequate oxygenation; saturating well on room air, will maintain SPO2 >92%  Cardiovascular: Adequately perfused. Hemodynamically stable. C/w IVF  Gastrointestinal/Nutrition: CTA showed arterial blushes in rectum and sigmoid colon. Patient to go for colonoscopy today.  Renal/Genitourinary: BUN/Cr stable. Monitor UOP. Ins and Outs. Hyponatremia 2/2 to GI losses - trend BMP. C/w IVF LR @125cc/hr.  Hematologic: H/H stable. Trend CBC, transfuse PRN for Hgb goal >7.0. Coags wnl. Mechanical VTE ppx in setting of active bleeding.   Infectious Disease: Afebrile. No leukocytosis. Monitor off abx for now.  Tubes/Lines/Drains: PIV  Endocrine: Blood glucose wnl. Monitor FS.  Disposition: SICU    --------------------------------------------------------------------------------------    Critical Care Diagnoses: Gastrointestinal bleeding, Hyponatremia

## 2023-03-13 NOTE — HPI-TODAY'S VISIT:
Patient is a 46-year-old female last seen may 2022 and referred by Dr. Shaun Alonzo for evaluation of abnormal immunologic markers specifically smooth muscle antibody positive study in pt with suspected fatty liver and pcos.  A copy of the note will be sent to referring provider. 3/13/23 The complete blood count was normal. The Alkaline phosphatase 49, ast 13, alt 7. Previously ast 13, alt 11. Glad to see the las are staying low and stable. 2023 MRI 23 MRI LIver with no lesions, mild fat. superimposed fat seen near pora hepatis. Fat quant 10%, Decompresssed GB. SPleen normal. Seperate origin of hepatic artery, originating from aorta. The impression: Mild fat, no lesion  She has lost 8 more lbs. doing wel, encouraged.    recap  ultrasound showed liver to have an increased echogenicity.  There were likely focal sparing areas of fatty liver around the gallbladder fossa suspected. Bile duct showed no dilation and common bile duct was normal at 2.5 mm. Gallbladder showed a 2 x 3 x 4 mm echogenic focus in the gallbladder neck.  Slater sign was negative. Pancreas was unremarkable were seen. Right kidney was 12.5 cm and left kidney 11.8 cm with no hydronephrosis. Spleen was 10.7 cm. Liver vessels were patent. Overall increased echogenicity of the liver was seen due to fatty liver. The portal vein demonstrated normal directional flow. A 4 mm echogenic area was seen in the gallbladder neck which they thought could represent a stone versus a polyp. Possible focal fat sparing was seen around the gallbladder fossa area. If one compares this ultrasound to your Elisa 15 ultrasound, it also showed diffuse parenchymal echogenic changes of the liver but no focal far sparing lesions were suggested.  It did not mention any focal fat sparing, I do believe that this would warrant us to consider an MRI to get a better look and to confirm this is the case.  168 now and previously 198 and before that 220 and doing well more disipline and wathcing diet and less stress and more sleep  Labs ast 13, alt 11 and doing much better Patient is working dr awan on a monthly basis to help with the weight loss and is on ursodiol to help with the stones She is also on the wegovy with him and doing well  sleep was better too , not snoring now as well which is good to note   RECAP Elisa 15 ultrasound shows diffuse parenchymal echogenicity changes of liver but with no lesions. This would support a fatty liver as being likely present. No dilated bile ducts seen in common bile duct normal at 2 mm. Gallbladder was normal. Pancreas were visualized was unremarkable. Right kidney 12 cm left kidney 12.3 cm with no hydronephrosis. Spleen normal 11.3 cm. Liver vessels patent.  Still seeing Emperatriz. has lost 20 lbs on Saxenda. Encrouged.   Watching diet and exercise.  Dr. Awan did labs end of july and she said AST 21 and ALT 24 ,Alkaline phosphatase 52 She did labs for us yesterday through [facility]. Will try to get.   A1c: 5.6 in  prior to weight loss  Cholesterol: not checked recently. recommend getting at pcp   Sleep: sleeping much better.  RECAP previous visits:  May 16 labs show alpha-1 level normal at 147.  Ceruloplasmin normal at 28.5.  Hep C antibody negative at less than 0.1.   Hep A immunity not seen.  Hep B core total negative.  Hep B immunity was seen at 23.3. Hep B surface antigen negative.  Albumin 4.3 bilirubin 0.4 direct bilirubin 0.2 alkaline phosphatase 61. AST 39 and ALT 66 with ideal ALT less than 25.  Iron saturation normal at 16%.  Patient listed as being noted to have a history of goiter diagnosed in her 20s and being treated medications.   Noted to have now prediabetes with the last 2 years A1c at threshold of diabetes.    Polycystic ovary noted in the 20s.    Fatty liver noted in 2021.    Has been gaining weight for the last 5 years and has increased from 130-150 to 180 in 2017.  Peaking at 215 which is her current weight with endocrine and 218 here  Had failed other efforts to lose weight.    Noted to have high liver labs and indeterminate fib 4 score.    AST listed as 151  platelets 345.  A1c less than 5.7%.  Also with polycystic ovarian issues and not on tx and she had this with getting pregnancy.  She had 2 kids and last one in . dropped to 150 after and then took off 2017 to now.  2022 labs showed ERNESTO speckled at a level of 160 homogeneous.  Prolactin normal at 18.1.  TSH 2.62.  DHEA morning.  Alpha-1 140.  CPK 80.  Serologic assay  inflammation grade of 0.67.  Suggestive of severe activity.   Cholesterol 211 triglyceride 84 HDL 57 glucose 111 BUN of 12 cr 0.4 sodium 139 potassium 4.6 calcium 9.6 albumin 4.5 globin 0.6 alkaline phosphatase 65   F-actin +37.8 ASMA level of 20.  Vitamin D 35.  Hep B core total negative.  Testosterone 37.  AMA negative at 2.7.  b12 slightly up at 951.  Folate normal 10.9 uric acid 5.2.  Hep C antibody negative.  White blood cell count 9.9 hemoglobin 13.6 platelet count 391 hemoglobin A1c 6.0.  B surface antigen negative.  Ferritin 114.  IgG normal at 1245 IgM normal at 68 IgA slightly up at 578.  This would suggest this is a low positive immune marker due to the fatty liver and not primary immune issues.  Hep B immunity seen but level not sent.  Patient listed on a recent visit of 2022 as having a weight of 218.6 height of five-point 5-1/4 and a BMI of 36.1.  Allergies listed as none.    Medicines include ibuprofen 800 mg up to every 8 hours as needed, B complex every day and vitamin D.  Not on any meds yet.  Past history includes abnormal immune marker, sleep deprivation, simple goiter, suspected nonalcoholic fatty liver, prediabetes, hyperlipidemia, polycystic ovary syndrome, obesity, hirsutism, asthma, gluten sensitivity, and fatigue.  Family history Father with glaucoma, macular degeneration, hyperlipidemia, hypertension.  Mother with congestive heart failure hyperlipidemia and hypertensive disorder.  Social history patient does have approximately 1-2 drinks a week and stopped.  Not a smoker.  2 kids.  Past surgical history  in 2008 and 2010.  Pt is on exercise and she says she walks 30 -40 min of walking.  Diet wise she is daily harvest and does smoothies and dinner with them.  Gave info re chen and med diets.  May need to get dietician.

## 2023-03-14 ENCOUNTER — OFFICE VISIT (OUTPATIENT)
Dept: URBAN - METROPOLITAN AREA CLINIC 86 | Facility: CLINIC | Age: 48
End: 2023-03-14

## 2023-07-13 ENCOUNTER — LAB OUTSIDE AN ENCOUNTER (OUTPATIENT)
Dept: URBAN - METROPOLITAN AREA CLINIC 86 | Facility: CLINIC | Age: 48
End: 2023-07-13

## 2023-07-28 ENCOUNTER — LAB OUTSIDE AN ENCOUNTER (OUTPATIENT)
Dept: URBAN - METROPOLITAN AREA CLINIC 86 | Facility: CLINIC | Age: 48
End: 2023-07-28

## 2023-08-01 ENCOUNTER — OFFICE VISIT (OUTPATIENT)
Dept: URBAN - METROPOLITAN AREA TELEHEALTH 2 | Facility: TELEHEALTH | Age: 48
End: 2023-08-01

## 2023-08-04 LAB
A/G RATIO: 1.7
ALBUMIN: 4.5
ALKALINE PHOSPHATASE: 47
ALT (SGPT): 7
AST (SGOT): 14
BASO (ABSOLUTE): 0
BASOS: 1
BILIRUBIN, TOTAL: 0.5
BUN/CREATININE RATIO: 10
BUN: 9
CALCIUM: 10.1
CARBON DIOXIDE, TOTAL: 25
CHLORIDE: 98
CREATININE: 0.89
EGFR: 81
EOS (ABSOLUTE): 0.2
EOS: 2
GLOBULIN, TOTAL: 2.6
GLUCOSE: 81
HEMATOCRIT: 39.9
HEMATOLOGY COMMENTS:: (no result)
HEMOGLOBIN: 13.3
IMMATURE CELLS: (no result)
IMMATURE GRANS (ABS): 0
IMMATURE GRANULOCYTES: 0
LYMPHS (ABSOLUTE): 2.7
LYMPHS: 32
MCH: 30.7
MCHC: 33.3
MCV: 92
MONOCYTES(ABSOLUTE): 0.6
MONOCYTES: 8
NEUTROPHILS (ABSOLUTE): 4.8
NEUTROPHILS: 57
NRBC: (no result)
PLATELETS: 343
POTASSIUM: 4.3
PROTEIN, TOTAL: 7.1
RBC: 4.33
RDW: 12.8
SODIUM: 136
WBC: 8.4

## 2023-08-09 ENCOUNTER — TELEPHONE ENCOUNTER (OUTPATIENT)
Dept: URBAN - METROPOLITAN AREA CLINIC 86 | Facility: CLINIC | Age: 48
End: 2023-08-09

## 2023-08-09 NOTE — HPI-TODAY'S VISIT:
Dear Maribell Marinelli,  The August 3 labs were sent to me.  Glucose 81, creatinine 0.89, sodium 136, potassium 4.3, bilirubin 0.5, alkaline phosphatase 47, AST 14, ALT 7.  White blood cells 8.4, red blood cells 4.3, hemoglobin 13.3, MCV 92, platelets 333 these are normal.  You also did an ultrasound on August 3 at [place] and the liver was normal in echogenicity and they did not see any lesions.  The common bile duct was 4 mm and this is normal.  The gallbladder unremarkable.  The right and left kidney appeared unremarkable.  Pancreas was normal were seen.  Spleen 9.1 cm and this is normal.  They thought the aorta was normal in caliber where it was seen.  The hepatic vasculature was patent.  Overall they noted that this was an unremarkable abdominal ultrasound.  If you recall the MRI done in January of this year thought the fat quantification was 10%.  You also had an ultrasound in December 2022 that showed increased echogenicity.  Now the ultrasound is not suggesting this.  This is good to see.  We will review this at the next visit but it may be a good idea to repeat the MRI as our next imaging to determine if the fat quantification has improved given the ultrasound is suggesting this.  Happy to see that the labs are normal and the ultrasound is looking better as well.  Hope you are well.  Yenni Pierson PA-C

## 2023-08-14 ENCOUNTER — TELEPHONE ENCOUNTER (OUTPATIENT)
Dept: URBAN - METROPOLITAN AREA CLINIC 86 | Facility: CLINIC | Age: 48
End: 2023-08-14

## 2023-08-14 NOTE — HPI-TODAY'S VISIT:
Dear Maribell Marinelli,  The recent August 3 ultrasound was sent to me.  The liver normal in echogenicity without lesions.  The bile ducts were not dilated.  The common duct is 4 mm and this is normal.  Pancreas was normal were seen.  The right and left kidneys appeared normal.  Spleen 9.1 cm and this is normal.  The hepatic vascular was patent.  Overall they felt this was a unremarkable abdominal ultrasound. This is good to see.   Yenni Pierson PA-C

## 2023-08-21 ENCOUNTER — OFFICE VISIT (OUTPATIENT)
Dept: URBAN - METROPOLITAN AREA CLINIC 86 | Facility: CLINIC | Age: 48
End: 2023-08-21
Payer: COMMERCIAL

## 2023-08-21 VITALS
TEMPERATURE: 97.2 F | SYSTOLIC BLOOD PRESSURE: 102 MMHG | DIASTOLIC BLOOD PRESSURE: 73 MMHG | HEIGHT: 65 IN | WEIGHT: 152 LBS | HEART RATE: 77 BPM | BODY MASS INDEX: 25.33 KG/M2

## 2023-08-21 DIAGNOSIS — Z71.89 VACCINE COUNSELING: ICD-10-CM

## 2023-08-21 DIAGNOSIS — Z72.820 SLEEP DEPRIVATION: ICD-10-CM

## 2023-08-21 DIAGNOSIS — R76.9 ABNORMAL IMMUNOLOGICAL FINDING IN SERUM: ICD-10-CM

## 2023-08-21 DIAGNOSIS — E78.5 HYPERLIPIDEMIA: ICD-10-CM

## 2023-08-21 DIAGNOSIS — R73.03 PREDIABETES: ICD-10-CM

## 2023-08-21 DIAGNOSIS — E28.2 PCOS (POLYCYSTIC OVARIAN SYNDROME): ICD-10-CM

## 2023-08-21 DIAGNOSIS — K76.0 FATTY LIVER: ICD-10-CM

## 2023-08-21 DIAGNOSIS — Z79.899 HIGH RISK MEDICATION USE: ICD-10-CM

## 2023-08-21 DIAGNOSIS — E66.9 OBESITY: ICD-10-CM

## 2023-08-21 DIAGNOSIS — R74.8 ABNORMAL LIVER ENZYMES: ICD-10-CM

## 2023-08-21 DIAGNOSIS — R93.5 ABNORMAL US (ULTRASOUND) OF ABDOMEN: ICD-10-CM

## 2023-08-21 PROCEDURE — 99214 OFFICE O/P EST MOD 30 MIN: CPT

## 2023-08-21 RX ORDER — B-COMPLEX WITH VITAMIN C
AS DIRECTED TABLET ORAL
Status: ACTIVE | COMMUNITY

## 2023-08-21 RX ORDER — CHOLECALCIFEROL (VITAMIN D3) 50 MCG
1 TABLET TABLET ORAL ONCE A DAY
Status: ACTIVE | COMMUNITY

## 2023-08-21 RX ORDER — SPIRONOLACTONE 50 MG/1
TABLET ORAL
Qty: 60 TABLET | Status: ACTIVE | COMMUNITY

## 2023-08-21 RX ORDER — IBUPROFEN 800 MG/1
1 TABLET WITH FOOD OR MILK AS NEEDED TABLET ORAL
Status: ON HOLD | COMMUNITY

## 2023-08-21 RX ORDER — SEMAGLUTIDE 2.4 MG/.75ML
INJECT 2.4 MG EVERY WEEK BY SUBCUTANEOUS ROUTE INJECTION, SOLUTION SUBCUTANEOUS
Qty: 9 MILLILITER | Refills: 0 | Status: ACTIVE | COMMUNITY

## 2023-08-21 RX ORDER — URSODIOL 250 MG/1
TAKE 1 TABLET BY MOUTH TWICE A DAY TABLET ORAL
Qty: 180 EACH | Refills: 1 | Status: ACTIVE | COMMUNITY

## 2023-08-21 NOTE — HPI-TODAY'S VISIT:
Patient is a 48-year-old female last seen 2023 and had been referred by Dr. Shaun Alonzo for evaluation of abnormal immunologic markers specifically smooth muscle antibody positive study in pt with suspected fatty liver and pcos.  A copy of the note will be sent to referring provider.  She did the u.s  August 3 ultrasound was sent to me.  The liver normal in echogenicity without lesions.  The bile ducts were not dilated.  The common duct is 4 mm and this is normal.  Pancreas was normal were seen.  The right and left kidneys appeared normal.  Spleen 9.1 cm and this is normal.  The hepatic vascular was patent.  Overall they felt this was a unremarkable abdominal ultrasound.  August 3 labs were sent to me.  Glucose 81, creatinine 0.89, sodium 136, potassium 4.3, bilirubin 0.5, alkaline phosphatase 47, AST 14, ALT 7.  White blood cells 8.4, red blood cells 4.3, hemoglobin 13.3, MCV 92, platelets 333 these are normal.    If you recall the MRI done in January of this year thought the fat quantification was 10%.    2022 that showed increased echogenicity.    3/13/23 The complete blood count was normal. The Alkaline phosphatase 49, ast 13, alt 7. Previously ast 13, alt 11. Glad to see the las are staying low and stable.  23 MRI LIver with no lesions, mild fat. superimposed fat seen near samir hepatis. Fat quant 10%, Decompresssed GB. Spleen normal. Seperate origin of hepatic artery, originating from aorta. Their impression: Mild fat, no lesion  She has lost 8 more lbs. in March.  Aug 2023: went 220 to 152 now.  Dr Clinton Smith and staying the course 2.4mg. SHe has target to 145 and but settled on this dose.   ultrasound showed liver to have an increased echogenicity.  There were likely focal sparing areas of fatty liver around the gallbladder fossa suspected. Bile duct showed no dilation and common bile duct was normal at 2.5 mm. Gallbladder showed a 2 x 3 x 4 mm echogenic focus in the gallbladder neck.  Slater sign was negative. Pancreas was unremarkable were seen. Right kidney was 12.5 cm and left kidney 11.8 cm with no hydronephrosis. Spleen was 10.7 cm. Liver vessels were patent. Overall increased echogenicity of the liver was seen due to fatty liver. The portal vein demonstrated normal directional flow. A 4 mm echogenic area was seen in the gallbladder neck which they thought could represent a stone versus a polyp. Possible focal fat sparing was seen around the gallbladder fossa area.  If one compares this ultrasound to your Elisa 15 ultrasound, it also showed diffuse parenchymal echogenic changes of the liver but no focal fat sparing lesions were suggested.  It did not mention any focal fat sparing, I do believe that this would warrant us to consider an MRI to get a better look and to confirm this is the case.  168 now and previously 198 and before that 220 and doing well more disipline and wathcing diet and less stress and more sleep  Labs ast 13, alt 11 and doing much better Patient is working dr awan on a monthly basis to help with the weight loss and is on ursodiol to help with the stones She is also on the wegovy with him and doing well  sleep was better too , not snoring now as well which is good to note    Elisa 15 ultrasound shows diffuse parenchymal echogenicity changes of liver but with no lesions. This would support a fatty liver as being likely present. No dilated bile ducts seen in common bile duct normal at 2 mm. Gallbladder was normal. Pancreas were visualized was unremarkable. Right kidney 12 cm left kidney 12.3 cm with no hydronephrosis. Spleen normal 11.3 cm. Liver vessels patent.  Still seeing Emperatriz. has lost 20 lbs on Saxenda. Encrouged.   Watching diet and exercise.  Dr. Awan did labs end of july and she said AST 21 and ALT 24 ,Alkaline phosphatase 52 She did labs for us yesterday through [facility] [employer]. Will try to get.   A1c: 5.6 in  prior to weight loss  Cholesterol: not checked recently. recommend getting at pcp   Sleep: sleeping much better.  May 16 labs show alpha-1 level normal at 147.  Ceruloplasmin normal at 28.5.  Hep C antibody negative at less than 0.1.   Hep A immunity not seen and has not done this. Hep B core total negative.  Hep B immunity was seen at 23.3. Hep B surface antigen negative.  Albumin 4.3 bilirubin 0.4 direct bilirubin 0.2 alkaline phosphatase 61. AST 39 and ALT 66 with ideal ALT less than 25.  Iron saturation normal at 16%.  Patient listed as being noted to have a history of goiter diagnosed in her 20s and being treated medications.   Noted to have now prediabetes with the last 2 years A1c at threshold of diabetes.    Polycystic ovary noted in the 20s.    Fatty liver noted in 2021.    Has been gaining weight for the last 5 years and has increased from 130-150 to 180 in 2017.  Peaking at 215 which is her current weight with endocrine and 218 here  Had failed other efforts to lose weight.    Noted to have high liver labs and indeterminate fib 4 score.    AST listed as 151  platelets 345.  A1c less than 5.7%.  Also with polycystic ovarian issues and not on tx and she had this with getting pregnancy.  She had 2 kids and last one in . dropped to 150 after and then took off 2017 to now.  2022 labs showed ERNESTO speckled at a level of 160 homogeneous.  Prolactin normal at 18.1.  TSH 2.62.  DHEA morning.  Alpha-1 140.  CPK 80.  Serologic assay  inflammation grade of 0.67.  Suggestive of severe activity.   Cholesterol 211 triglyceride 84 HDL 57 glucose 111 BUN of 12 cr 0.4 sodium 139 potassium 4.6 calcium 9.6 albumin 4.5 globin 0.6 alkaline phosphatase 65   F-actin +37.8 ASMA level of 20.  Vitamin D 35.  Hep B core total negative.  Testosterone 37.  AMA negative at 2.7.  b12 slightly up at 951.  Folate normal 10.9 uric acid 5.2.  Hep C antibody negative.  White blood cell count 9.9 hemoglobin 13.6 platelet count 391 hemoglobin A1c 6.0.  B surface antigen negative.  Ferritin 114.  IgG normal at 1245 IgM normal at 68 IgA slightly up at 578.  This would suggest this is a low positive immune marker due to the fatty liver and not primary immune issues.  Hep B immunity seen but level not sent.  Patient listed on a recent visit of 2022 as having a weight of 218.6 height of five-point 5-1/4 and a BMI of 36.1.  Allergies listed as none.    Medicines include ibuprofen 800 mg up to every 8 hours as needed, B complex every day and vitamin D.  Not on any meds yet.  Past history includes abnormal immune marker, sleep deprivation, simple goiter, suspected nonalcoholic fatty liver, prediabetes, hyperlipidemia, polycystic ovary syndrome, obesity, hirsutism, asthma, gluten sensitivity, and fatigue.  Family history Father with glaucoma, macular degeneration, hyperlipidemia, hypertension.  Mother with congestive heart failure hyperlipidemia and hypertensive disorder.  Social history patient does have approximately 1-2 drinks a week and stopped.  Not a smoker.  2 kids.  Past surgical history  in 2008 and 2010.  Pt is on exercise and she says she walks 30 -40 min of walking.  Diet wise she is daily harvest and does smoothies and dinner with them.  Prior gave info re chen and med diets.  Plan. 1. See in 6m. 2. Check immune markers next time. 3. DO labs and us then and see then. 4. She will follow with Dr Clinton gamboa.  Duration of the visit was 35 minutes with 10 minutes of chart prep and 25 minutes of face to face visit reviewing recent records, discussing their current status and the future plans for the patient.

## 2023-08-21 NOTE — EXAM-PHYSICAL EXAM
Gen: awake and responsive. Eyes: anicteric and normal lids. Mouth: normal lips. Nose: covered with mask. Hearing: intact grossly. Neck: trachea midline and no jvd. CV: RRR no s3. Lungs: clear. No wheezes, Abd: Soft, nabs, nr, NT. Not able to feel liver or spleen. Ext: no sig edema, slight palm erythema. Neuro: moves all 4 ext grossly. No asterixis. Skin: mentions some foot pruritis and slight palm erythema.

## 2024-01-11 ENCOUNTER — WEB ENCOUNTER (OUTPATIENT)
Dept: URBAN - METROPOLITAN AREA CLINIC 86 | Facility: CLINIC | Age: 49
End: 2024-01-11

## 2024-02-01 ENCOUNTER — LAB (OUTPATIENT)
Dept: URBAN - METROPOLITAN AREA CLINIC 86 | Facility: CLINIC | Age: 49
End: 2024-02-01

## 2024-02-01 ENCOUNTER — US W/ DOPP (OUTPATIENT)
Dept: URBAN - METROPOLITAN AREA CLINIC 91 | Facility: CLINIC | Age: 49
End: 2024-02-01
Payer: COMMERCIAL

## 2024-02-01 DIAGNOSIS — R74.8 ABNORMAL LIVER ENZYMES: ICD-10-CM

## 2024-02-01 PROCEDURE — 76705 ECHO EXAM OF ABDOMEN: CPT

## 2024-02-01 PROCEDURE — 93975 VASCULAR STUDY: CPT

## 2024-02-08 ENCOUNTER — LAB (OUTPATIENT)
Dept: URBAN - METROPOLITAN AREA CLINIC 86 | Facility: CLINIC | Age: 49
End: 2024-02-08

## 2024-02-08 ENCOUNTER — OV EP (OUTPATIENT)
Dept: URBAN - METROPOLITAN AREA CLINIC 86 | Facility: CLINIC | Age: 49
End: 2024-02-08
Payer: COMMERCIAL

## 2024-02-08 VITALS
BODY MASS INDEX: 24.49 KG/M2 | SYSTOLIC BLOOD PRESSURE: 104 MMHG | TEMPERATURE: 97 F | WEIGHT: 147 LBS | HEIGHT: 65 IN | DIASTOLIC BLOOD PRESSURE: 78 MMHG | HEART RATE: 84 BPM

## 2024-02-08 DIAGNOSIS — Z71.89 VACCINE COUNSELING: ICD-10-CM

## 2024-02-08 DIAGNOSIS — R93.5 ABNORMAL US (ULTRASOUND) OF ABDOMEN: ICD-10-CM

## 2024-02-08 DIAGNOSIS — R76.9 ABNORMAL IMMUNOLOGICAL FINDING IN SERUM: ICD-10-CM

## 2024-02-08 DIAGNOSIS — R73.03 PREDIABETES: ICD-10-CM

## 2024-02-08 DIAGNOSIS — Z79.899 HIGH RISK MEDICATION USE: ICD-10-CM

## 2024-02-08 DIAGNOSIS — Z72.820 SLEEP DEPRIVATION: ICD-10-CM

## 2024-02-08 DIAGNOSIS — E28.2 PCOS (POLYCYSTIC OVARIAN SYNDROME): ICD-10-CM

## 2024-02-08 DIAGNOSIS — K76.0 FATTY LIVER: ICD-10-CM

## 2024-02-08 DIAGNOSIS — E66.9 OBESITY: ICD-10-CM

## 2024-02-08 DIAGNOSIS — E78.5 HYPERLIPIDEMIA: ICD-10-CM

## 2024-02-08 DIAGNOSIS — R74.8 ABNORMAL LIVER ENZYMES: ICD-10-CM

## 2024-02-08 PROCEDURE — 99214 OFFICE O/P EST MOD 30 MIN: CPT

## 2024-02-08 RX ORDER — CHOLECALCIFEROL (VITAMIN D3) 50 MCG
1 TABLET TABLET ORAL ONCE A DAY
Status: ACTIVE | COMMUNITY

## 2024-02-08 RX ORDER — SPIRONOLACTONE 50 MG/1
3 TABLET TABLET ORAL ONCE A DAY
Qty: 90 TABLET | Status: ACTIVE | COMMUNITY

## 2024-02-08 RX ORDER — URSODIOL 250 MG/1
TAKE 1 TABLET BY MOUTH TWICE A DAY TABLET ORAL
Qty: 180 EACH | Refills: 1 | Status: DISCONTINUED | COMMUNITY

## 2024-02-08 RX ORDER — B-COMPLEX WITH VITAMIN C
AS DIRECTED TABLET ORAL
Status: ACTIVE | COMMUNITY

## 2024-02-08 RX ORDER — IBUPROFEN 800 MG/1
1 TABLET WITH FOOD OR MILK AS NEEDED TABLET ORAL
Status: ON HOLD | COMMUNITY

## 2024-02-08 RX ORDER — SEMAGLUTIDE 2.4 MG/.75ML
INJECT 2.4 MG EVERY WEEK BY SUBCUTANEOUS ROUTE INJECTION, SOLUTION SUBCUTANEOUS
Qty: 9 MILLILITER | Refills: 0 | Status: ACTIVE | COMMUNITY

## 2024-02-08 NOTE — HPI-TODAY'S VISIT:
Patient is a 48-year-old female last seen Aug 2023 and had been referred by Dr. Shaun Alonzo for evaluation of abnormal immunologic markers specifically smooth muscle antibody positive study in pt with suspected fatty liver and pcos.  A copy of the note will be sent to referring provider.   ultrasound shows pancreas unremarkable. Liver homogeneous/even in echotexture with no discrete lesion. Liver vessels patent as expected. Gallbladder with no stones. Gallbladder otherwise unremarkable. Common bile duct 3 mm which is normal. Right kidney 10.5 cm with no hydronephrosis. Spleen 8.7 cm which is normal. Splenic vessels patent. Prior August ultrasound also showed liver was normal in echogenicity and spleen was 9.1 cm which is within the variability of this technique.  She has not done any labs.  .s  August 3 ultrasound was sent to me.  The liver normal in echogenicity without lesions.  The bile ducts were not dilated.  The common duct is 4 mm and this is normal.  Pancreas was normal were seen.  The right and left kidneys appeared normal.  Spleen 9.1 cm and this is normal.  The hepatic vascular was patent.  Overall they felt this was a unremarkable abdominal ultrasound.  August 3 labs were sent to me.  Glucose 81, creatinine 0.89, sodium 136, potassium 4.3, bilirubin 0.5, alkaline phosphatase 47, AST 14, ALT 7.  White blood cells 8.4, red blood cells 4.3, hemoglobin 13.3, MCV 92, platelets 333 these are normal.    If you recall the MRI done in 2023 fat quantification was 10%.    2022 that showed increased echogenicity.    SHe says on wegovy and she is 2.4mg weekly and weight is at 145-148.  She says target 145. Started 220 and 2023 152 and now 145.   A1c 4.6%.  3/13/23 The complete blood count was normal. The Alkaline phosphatase 49, ast 13, alt 7. Previously ast 13, alt 11. Glad to see the las are staying low and stable.  23 MRI LIver with no lesions, mild fat. superimposed fat seen near samir hepatis. Fat quant 10%, Decompresssed GB. Spleen normal. Seperate origin of hepatic artery, originating from aorta. Their impression: Mild fat, no lesion  She has lost 8 more lbs. in March.  Aug 2023: went 220 to 152 now.  Dr Clinton Smith and staying the course 2.4mg. SHe has target to 145 and but settled on this dose.   ultrasound showed liver to have an increased echogenicity.  There were likely focal sparing areas of fatty liver around the gallbladder fossa suspected. Bile duct showed no dilation and common bile duct was normal at 2.5 mm. Gallbladder showed a 2 x 3 x 4 mm echogenic focus in the gallbladder neck.  Slater sign was negative. Pancreas was unremarkable were seen. Right kidney was 12.5 cm and left kidney 11.8 cm with no hydronephrosis. Spleen was 10.7 cm. Liver vessels were patent. Overall increased echogenicity of the liver was seen due to fatty liver. The portal vein demonstrated normal directional flow. A 4 mm echogenic area was seen in the gallbladder neck which they thought could represent a stone versus a polyp. Possible focal fat sparing was seen around the gallbladder fossa area.  If one compares this ultrasound to your Elisa 15 ultrasound, it also showed diffuse parenchymal echogenic changes of the liver but no focal fat sparing lesions were suggested.  It did not mention any focal fat sparing, I do believe that this would warrant us to consider an MRI to get a better look and to confirm this is the case.  168 now and previously 198 and before that 220 and doing well more disipline and wathcing diet and less stress and more sleep  Labs ast 13, alt 11 and doing much better Patient is working dr awan on a monthly basis to help with the weight loss and is on ursodiol to help with the stones She is also on the wegovy with him and doing well  sleep was better too , not snoring now as well which is good to note    Elisa 15 ultrasound shows diffuse parenchymal echogenicity changes of liver but with no lesions. This would support a fatty liver as being likely present. No dilated bile ducts seen in common bile duct normal at 2 mm. Gallbladder was normal. Pancreas were visualized was unremarkable. Right kidney 12 cm left kidney 12.3 cm with no hydronephrosis. Spleen normal 11.3 cm. Liver vessels patent.  Still seeing Emperatriz. has lost 20 lbs on Saxenda. Encrouged.   Watching diet and exercise.  Dr. Awan did labs end of july and she said AST 21 and ALT 24 ,Alkaline phosphatase 52 She did labs for us yesterday through [facility]. Will try to get.   A1c: 5.6 in  prior to weight loss  Cholesterol: not checked recently. recommend getting at pcp   Sleep: sleeping much better.  May 16 labs show alpha-1 level normal at 147.  Ceruloplasmin normal at 28.5.  Hep C antibody negative at less than 0.1.   Hep A immunity not seen and has not done this. Hep B core total negative.  Hep B immunity was seen at 23.3. Hep B surface antigen negative.  Albumin 4.3 bilirubin 0.4 direct bilirubin 0.2 alkaline phosphatase 61. AST 39 and ALT 66 with ideal ALT less than 25.  Iron saturation normal at 16%.  Patient listed as being noted to have a history of goiter diagnosed in her 20s and being treated medications.   Noted to have now prediabetes with the last 2 years A1c at threshold of diabetes.    Polycystic ovary noted in the 20s.    Fatty liver noted in 2021.    Has been gaining weight for the last 5 years and has increased from 130-150 to 180 in 2017.  Peaking at 215 which is her current weight with endocrine and 218 here  Had failed other efforts to lose weight.    Noted to have high liver labs and indeterminate fib 4 score.    AST listed as 151  platelets 345.  A1c less than 5.7%.  Also with polycystic ovarian issues and not on tx and she had this with getting pregnancy.  She had 2 kids and last one in . dropped to 150 after and then took off 2017 to now.  2022 labs showed ERNESTO speckled at a level of 160 homogeneous.  Prolactin normal at 18.1.  TSH 2.62.  DHEA morning.  Alpha-1 140.  CPK 80.  Serologic assay  inflammation grade of 0.67.  Suggestive of severe activity.   Cholesterol 211 triglyceride 84 HDL 57 glucose 111 BUN of 12 cr 0.4 sodium 139 potassium 4.6 calcium 9.6 albumin 4.5 globin 0.6 alkaline phosphatase 65   F-actin +37.8 ASMA level of 20.  Vitamin D 35.  Hep B core total negative.  Testosterone 37.  AMA negative at 2.7.  b12 slightly up at 951.  Folate normal 10.9 uric acid 5.2.  Hep C antibody negative.  White blood cell count 9.9 hemoglobin 13.6 platelet count 391 hemoglobin A1c 6.0.  B surface antigen negative.  Ferritin 114.  IgG normal at 1245 IgM normal at 68 IgA slightly up at 578.  This would suggest this is a low positive immune marker due to the fatty liver and not primary immune issues.  Hep B immunity seen but level not sent.  Patient listed on a recent visit of 2022 as having a weight of 218.6 height of five-point 5-1/4 and a BMI of 36.1.  Allergies listed as none.    Medicines include ibuprofen 800 mg up to every 8 hours as needed, B complex every day and vitamin D.  Not on any meds yet.  Past history includes abnormal immune marker, sleep deprivation, simple goiter, suspected nonalcoholic fatty liver, prediabetes, hyperlipidemia, polycystic ovary syndrome, obesity, hirsutism, asthma, gluten sensitivity, and fatigue.  Family history Father with glaucoma, macular degeneration, hyperlipidemia, hypertension.  Mother with congestive heart failure hyperlipidemia and hypertensive disorder.  Social history patient does have approximately 1-2 drinks a week and stopped.  Not a smoker.  2 kids.  Past surgical history  in 2008 and 2010.  Pt is on exercise and she says she walks 30 -40 min of walking.  Diet wise she is daily harvest and does smoothies and dinner with them.  Prior gave info re chen and med diets.  Plan. 1. See in 6m. 2. Need to immune markers and labs. 3. Do labs necxt time and us once a year. 4. Slow down from there.  Duration of the visit was 35 minutes with 10 minutes of chart prep and 25 minutes of face to face visit reviewing recent records, discussing their current status and the future plans for the patient.

## 2024-02-14 LAB
ACTIN (SMOOTH MUSCLE) ANTIBODY (IGG): 35
ALBUMIN/GLOBULIN RATIO: 1.5
ALBUMIN: 4.5
ALKALINE PHOSPHATASE: 38
ALT (SGPT): 8
ANA SCREEN, IFA: POSITIVE
AST (SGOT): 11
BILIRUBIN, DIRECT: 0.2
BILIRUBIN, INDIRECT: 0.7
BILIRUBIN, TOTAL: 0.9
BUN/CREATININE RATIO: (no result)
CALCIUM: 10
CARBON DIOXIDE: 29
CHLORIDE: 101
CREATININE: 0.91
EGFR: 79
GLOBULIN: 3
GLUCOSE: 84
POTASSIUM: 4.4
PROTEIN, TOTAL: 7.5
SODIUM: 139
UREA NITROGEN (BUN): 15

## 2024-08-06 ENCOUNTER — WEB ENCOUNTER (OUTPATIENT)
Dept: URBAN - METROPOLITAN AREA CLINIC 86 | Facility: CLINIC | Age: 49
End: 2024-08-06

## 2024-08-08 ENCOUNTER — LAB OUTSIDE AN ENCOUNTER (OUTPATIENT)
Dept: URBAN - METROPOLITAN AREA CLINIC 86 | Facility: CLINIC | Age: 49
End: 2024-08-08

## 2024-08-12 ENCOUNTER — OFFICE VISIT (OUTPATIENT)
Dept: URBAN - METROPOLITAN AREA TELEHEALTH 2 | Facility: TELEHEALTH | Age: 49
End: 2024-08-12
Payer: COMMERCIAL

## 2024-08-12 ENCOUNTER — DASHBOARD ENCOUNTERS (OUTPATIENT)
Age: 49
End: 2024-08-12

## 2024-08-12 VITALS — WEIGHT: 152 LBS | BODY MASS INDEX: 25.33 KG/M2 | HEIGHT: 65 IN

## 2024-08-12 DIAGNOSIS — R73.03 PREDIABETES: ICD-10-CM

## 2024-08-12 DIAGNOSIS — K76.0 FATTY LIVER: ICD-10-CM

## 2024-08-12 DIAGNOSIS — E28.2 PCOS (POLYCYSTIC OVARIAN SYNDROME): ICD-10-CM

## 2024-08-12 DIAGNOSIS — R74.8 ABNORMAL LIVER ENZYMES: ICD-10-CM

## 2024-08-12 PROCEDURE — 99214 OFFICE O/P EST MOD 30 MIN: CPT

## 2024-08-12 RX ORDER — CHOLECALCIFEROL (VITAMIN D3) 50 MCG
1 TABLET TABLET ORAL ONCE A DAY
Status: ACTIVE | COMMUNITY

## 2024-08-12 RX ORDER — IBUPROFEN 800 MG/1
1 TABLET WITH FOOD OR MILK AS NEEDED TABLET ORAL
Status: ON HOLD | COMMUNITY

## 2024-08-12 RX ORDER — SEMAGLUTIDE 2.4 MG/.75ML
INJECT 2.4 MG EVERY WEEK BY SUBCUTANEOUS ROUTE INJECTION, SOLUTION SUBCUTANEOUS
Qty: 9 MILLILITER | Refills: 0 | Status: ACTIVE | COMMUNITY

## 2024-08-12 RX ORDER — SPIRONOLACTONE 50 MG/1
3 TABLET TABLET ORAL ONCE A DAY
Qty: 90 TABLET | Status: ACTIVE | COMMUNITY

## 2024-08-12 RX ORDER — B-COMPLEX WITH VITAMIN C
AS DIRECTED TABLET ORAL
Status: ACTIVE | COMMUNITY

## 2024-08-12 NOTE — HPI-TODAY'S VISIT:
Patient is a 49-year-old female last seen 2024 and had been referred by Dr. Shaun Alonzo for evaluation of abnormal immunologic markers specifically smooth muscle antibody positive study in pt with suspected fatty liver and pcos.  A copy of the note will be sent to referring provider.  40% of pts with fatty liver will have these fp immunologic markers.  As for Dr Alonzo she is in monitoring and she is on sprinoloactone for the pcos and she is wegovy 2.4mg once a week.  Weight stayed at 152 and stable.   labs glucose 84 and bun 15 and cr 0.91 and na 139 and k 4.4 and cl 101 and co2 29 and ca 10.0 and asma 35 elevated and total protein 7.5 and alb 4.5 and tb 0.9 and direct bili 0.2 and alk 38 and ast 11 and alt 8. Labs lower than prior aug ast 23 and alt 32.  Sharri positive.    ultrasound shows pancreas unremarkable. Liver homogeneous/even in echotexture with no discrete lesion. Liver vessels patent as expected. Gallbladder with no stones. Gallbladder otherwise unremarkable. Common bile duct 3 mm which is normal. Right kidney 10.5 cm with no hydronephrosis. Spleen 8.7 cm which is normal. Splenic vessels patent. Prior August ultrasound also showed liver was normal in echogenicity and spleen was 9.1 cm which is within the variability of this technique.  She has not done any labs.  .s  August 3 2023 ultrasound was sent to me.  The liver normal in echogenicity without lesions.  The bile ducts were not dilated.  The common duct is 4 mm and this is normal.  Pancreas was normal were seen.  The right and left kidneys appeared normal.  Spleen 9.1 cm and this is normal.  The hepatic vascular was patent.  Overall they felt this was a unremarkable abdominal ultrasound.  August 3 labs were sent to me.  Glucose 81, creatinine 0.89, sodium 136, potassium 4.3, bilirubin 0.5, alkaline phosphatase 47, AST 14, ALT 7.  White blood cells 8.4, red blood cells 4.3, hemoglobin 13.3, MCV 92, platelets 333 these are normal.    If you recall the MRI done in 2023 fat quantification was 10%.    2022 that showed increased echogenicity.    SHe says on wegovy and she is 2.4mg weekly and weight prior was at 145-148. She says target 145. Started 220 and 2023   Weight down 75 pounds.  A1c 4.6% and thinks it is in 4s/  3/13/23 The complete blood count was normal. The Alkaline phosphatase 49, ast 13, alt 7. Previously ast 13, alt 11. Glad to see the las are staying low and stable.  23 MRI LIver with no lesions, mild fat. superimposed fat seen near asmir hepatis. Fat quant 10%, Decompresssed GB. Spleen normal. Seperate origin of hepatic artery, originating from aorta. Their impression: Mild fat, no lesion  She has lost 8 more lbs. in March.  Aug 2023: went 220 to 152 now.    ultrasound showed liver to have an increased echogenicity.  There were likely focal sparing areas of fatty liver around the gallbladder fossa suspected. Bile duct showed no dilation and common bile duct was normal at 2.5 mm. Gallbladder showed a 2 x 3 x 4 mm echogenic focus in the gallbladder neck.  Slater sign was negative. Pancreas was unremarkable were seen. Right kidney was 12.5 cm and left kidney 11.8 cm with no hydronephrosis. Spleen was 10.7 cm. Liver vessels were patent. Overall increased echogenicity of the liver was seen due to fatty liver. The portal vein demonstrated normal directional flow. A 4 mm echogenic area was seen in the gallbladder neck which they thought could represent a stone versus a polyp. Possible focal fat sparing was seen around the gallbladder fossa area.  If one compares this ultrasound to your Elisa 15 ultrasound, it also showed diffuse parenchymal echogenic changes of the liver but no focal fat sparing lesions were suggested.  It did not mention any focal fat sparing, I do believe that this would warrant us to consider an MRI to get a better look and to confirm this is the case.  168 now and previously 198 and before that 220 and doing well more disipline and wathcing diet and less stress and more sleep  Labs ast 13, alt 11 and doing much better Patient is working dr awan on a monthly basis to help with the weight loss and is on ursodiol to help with the stones She is also on the wegovy with him and doing well  sleep was better too , not snoring now as well which is good to note    Elisa 15 ultrasound shows diffuse parenchymal echogenicity changes of liver but with no lesions. This would support a fatty liver as being likely present. No dilated bile ducts seen in common bile duct normal at 2 mm. Gallbladder was normal. Pancreas were visualized was unremarkable. Right kidney 12 cm left kidney 12.3 cm with no hydronephrosis. Spleen normal 11.3 cm. Liver vessels patent.  Still seeing Emperatriz. has lost 20 lbs on Saxenda. Encrouged.   Watching diet and exercise.  Dr. Awan did labs end of july and she said AST 21 and ALT 24 ,Alkaline phosphatase 52 She did labs for us yesterday through [facility]. Will try to get.   A1c: 5.6 in  prior to weight loss  Cholesterol: not checked recently. recommend getting at pcp   Sleep: sleeping much better.  May 16 labs show alpha-1 level normal at 147.  Ceruloplasmin normal at 28.5.  Hep C antibody negative at less than 0.1.   Hep A immunity not seen and has not done this. Hep B core total negative.  Hep B immunity was seen at 23.3. Hep B surface antigen negative.  Albumin 4.3 bilirubin 0.4 direct bilirubin 0.2 alkaline phosphatase 61. AST 39 and ALT 66 with ideal ALT less than 25.  Iron saturation normal at 16%.  Patient listed as being noted to have a history of goiter diagnosed in her 20s and being treated medications.   Noted to have now prediabetes with the last 2 years A1c at threshold of diabetes.    Polycystic ovary noted in the 20s.    Fatty liver noted in 2021.    Has been gaining weight for the last 5 years and has increased from 130-150 to 180 in 2017.  Peaking at 215 which is her current weight with endocrine and 218 here  Had failed other efforts to lose weight.    Noted to have high liver labs and indeterminate fib 4 score.    AST listed as 151  platelets 345.  A1c less than 5.7%.  Also with polycystic ovarian issues and not on tx and she had this with getting pregnancy.  She had 2 kids and last one in . dropped to 150 after and then took off 2017 to now.  2022 labs showed SHARRI speckled at a level of 160 homogeneous.  Prolactin normal at 18.1.  TSH 2.62.  DHEA morning.  Alpha-1 140.  CPK 80.  Serologic assay  inflammation grade of 0.67.  Suggestive of severe activity.   Cholesterol 211 triglyceride 84 HDL 57 glucose 111 BUN of 12 cr 0.4 sodium 139 potassium 4.6 calcium 9.6 albumin 4.5 globin 0.6 alkaline phosphatase 65   F-actin +37.8 ASMA level of 20.  Vitamin D 35.  Hep B core total negative.  Testosterone 37.  AMA negative at 2.7.  b12 slightly up at 951.  Folate normal 10.9 uric acid 5.2.  Hep C antibody negative.  White blood cell count 9.9 hemoglobin 13.6 platelet count 391 hemoglobin A1c 6.0.  B surface antigen negative.  Ferritin 114.  IgG normal at 1245 IgM normal at 68 IgA slightly up at 578.  This would suggest this is a low positive immune marker due to the fatty liver and not primary immune issues.  Hep B immunity seen but level not sent.  Patient listed on a recent visit of 2022 as having a weight of 218.6 height of five-point 5-1/4 and a BMI of 36.1.  Allergies listed as none.    Medicines include ibuprofen 800 mg up to every 8 hours as needed, B complex every day and vitamin D.  Not on any meds yet.  Past history includes abnormal immune marker, sleep deprivation, simple goiter, suspected nonalcoholic fatty liver, prediabetes, hyperlipidemia, polycystic ovary syndrome, obesity, hirsutism, asthma, gluten sensitivity, and fatigue.  Family history Father with glaucoma, macular degeneration, hyperlipidemia, hypertension.  Mother with congestive heart failure hyperlipidemia and hypertensive disorder.  Social history patient does have approximately 1-2 drinks a week and stopped.  Not a smoker.  2 kids.  Past surgical history  in 2008 and 2010.  Pt is on exercise and she says she walks 30 -40 min of walking.  Diet wise she is daily harvest and does smoothies and dinner with them.  Prior gave info re chen and med diets.  Plan. 1. See in 6m and looking for stability. 2. Do basic labs or send the local labs. 3. DO want to check immune markers next time. 4. Pt will do u.s next time to see as normal last two times.   Duration of the visit was 32  minutes with 10 minutes of chart prep and 22 minutes of [facility] telemed visit reviewing recent records, discussing their current status and the future plans for the patient.

## 2024-08-21 ENCOUNTER — LAB OUTSIDE AN ENCOUNTER (OUTPATIENT)
Dept: URBAN - METROPOLITAN AREA CLINIC 86 | Facility: CLINIC | Age: 49
End: 2024-08-21

## 2024-08-22 ENCOUNTER — TELEPHONE ENCOUNTER (OUTPATIENT)
Dept: URBAN - METROPOLITAN AREA CLINIC 86 | Facility: CLINIC | Age: 49
End: 2024-08-22

## 2024-08-22 LAB
ALBUMIN: 4.6
ALKALINE PHOSPHATASE: 50
ALT (SGPT): 9
AST (SGOT): 15
BILIRUBIN, DIRECT: 0.17
BILIRUBIN, TOTAL: 0.7
PROTEIN, TOTAL: 7.7

## 2024-08-22 NOTE — HPI-TODAY'S VISIT:
Dear Maribell Marinelli. August 21 labs showed total protein normal at 7.7 albumin 4.6 bilirubin 0.7 globin 0.17 alk phos 50 AST 15 ALT of 9 with ideal ALT less than 25 so liver labs are doing well.  Please share with local providers. Good to see that the liver labs remain stable as we expected. Dr. Yanez

## 2025-02-01 ENCOUNTER — LAB OUTSIDE AN ENCOUNTER (OUTPATIENT)
Dept: URBAN - METROPOLITAN AREA TELEHEALTH 2 | Facility: TELEHEALTH | Age: 50
End: 2025-02-01

## 2025-02-04 ENCOUNTER — OFFICE VISIT (OUTPATIENT)
Dept: URBAN - METROPOLITAN AREA CLINIC 91 | Facility: CLINIC | Age: 50
End: 2025-02-04
Payer: COMMERCIAL

## 2025-02-04 DIAGNOSIS — K76.0 FATTY LIVER: ICD-10-CM

## 2025-02-04 PROCEDURE — 93975 VASCULAR STUDY: CPT

## 2025-02-04 PROCEDURE — 76705 ECHO EXAM OF ABDOMEN: CPT

## 2025-02-05 ENCOUNTER — TELEPHONE ENCOUNTER (OUTPATIENT)
Dept: URBAN - METROPOLITAN AREA CLINIC 86 | Facility: CLINIC | Age: 50
End: 2025-02-05

## 2025-02-05 NOTE — HPI-TODAY'S VISIT:
Dear Maribell Marinelli, February 4 ultrasound shows liver normal in contour and mildly increased in echogenicity with no suspicious lesions. No gallbladder wall thickening or pericholecystic fluid or stone seen and common bile duct normal at 2.5 mm. Pancreatic tail predominantly not seen due to gas but image pancreas was grossly unremarkable. Right kidney 10.8 cm with normal renal cortical thickness and echogenicity but no stones.  No hydronephrosis. Spleen 9.1 cm which is unremarkable. Liver and splenic vessels were patent as expected.   Overall they felt that you had mild steatosis and patent vessels.   Curiously your February of last year ultrasound had shown you to have a homogeneous/even liver with no clear fatty liver.  Did anything change recently?  Any recent illness that could have caused some issues with liver or change in status? Please let us know. Dr. Yanez

## 2025-02-06 ENCOUNTER — TELEPHONE ENCOUNTER (OUTPATIENT)
Dept: URBAN - METROPOLITAN AREA CLINIC 86 | Facility: CLINIC | Age: 50
End: 2025-02-06

## 2025-02-06 LAB
ABSOLUTE BASOPHILS: 36
ABSOLUTE EOSINOPHILS: 160
ABSOLUTE LYMPHOCYTES: 2412
ABSOLUTE MONOCYTES: 650
ABSOLUTE NEUTROPHILS: 5643
ALBUMIN/GLOBULIN RATIO: 1.7
ALBUMIN: 4.2
ALKALINE PHOSPHATASE: 37
ALT (SGPT): 8
ANA SCREEN, IFA: POSITIVE
AST (SGOT): 13
BASOPHILS: 0.4
BILIRUBIN, DIRECT: 0.1
BILIRUBIN, INDIRECT: 0.6
BILIRUBIN, TOTAL: 0.7
BUN/CREATININE RATIO: (no result)
CALCIUM: 8.9
CARBON DIOXIDE: 27
CHLORIDE: 102
CREATININE: 0.67
EGFR: 108
EOSINOPHILS: 1.8
GLOBULIN: 2.5
GLUCOSE: 81
HEMATOCRIT: 40.6
HEMOGLOBIN: 13.4
LYMPHOCYTES: 27.1
MCH: 31.3
MCHC: 33
MCV: 94.9
MONOCYTES: 7.3
MPV: 10.4
NEUTROPHILS: 63.4
PLATELET COUNT: 374
POTASSIUM: 4.3
PROTEIN, TOTAL: 6.7
RDW: 12.2
RED BLOOD CELL COUNT: 4.28
SODIUM: 137
UREA NITROGEN (BUN): 11
WHITE BLOOD CELL COUNT: 8.9

## 2025-02-06 NOTE — HPI-TODAY'S VISIT:
Dear Maribell Marinelli, Feb 4 labs show us the ERNESTO remains positive as it was last year in February as well (but titer was lower: discussed later in report). Glucose was 81 BUN of 11 creatinine 0.67 which was lower than last year.  Sodium 137 potassium 4.3 chloride 102 and CO2 27 calcium 8.9.  WBC count was 8.9 hemoglobin 13.4 MCV 94.9 plate count 374 with normal neutrophils and lymphocytes. Total protein was 6.7 albumin 4.2 bilirubin 0.7 with direct 0.1 so primarily still indirect bilirubin levels noted.  Alk phos 37 which was lower than last year AST 13 and ALT of 8 show these labs are actually lower than August of last year but slightly higher than February 2024 but still well within the normal in ideal range of an ALT of less than 25.   With regards to the ERNESTO titer or level was only at a level of 40 which is right on the cusp of being a low antibody level and it was of what is called a nuclear homogeneous pattern which is a nonspecific pattern that could be associated with immune disorders such as lupus, drug-induced lupus and juvenile idiopathic arthritis but again it could also be a false positive.  There was also an additional pattern seen called a nuclear speckled pattern which can be associated with other mixed connective tissue disease as well as other immune issues.   Always important to be aware of these issues but many times these results when the values are low could be false positive.   I would point out that in February 8 of last year your titer was 80 and at that timeframe the pattern was a little different and it was nuclear dense fine speckled so as you can see the level and the patterns have been variable.  About 40% of people with fatty liver can have these false positive immune markers and they continue to slowly overtime get better which we would hope by next year's labs that that may be the case given the trend. Dr. Yanez

## 2025-02-11 ENCOUNTER — OFFICE VISIT (OUTPATIENT)
Dept: URBAN - METROPOLITAN AREA CLINIC 86 | Facility: CLINIC | Age: 50
End: 2025-02-11
Payer: COMMERCIAL

## 2025-02-11 VITALS
DIASTOLIC BLOOD PRESSURE: 78 MMHG | BODY MASS INDEX: 26.82 KG/M2 | SYSTOLIC BLOOD PRESSURE: 112 MMHG | HEIGHT: 65 IN | HEART RATE: 74 BPM | WEIGHT: 161 LBS | TEMPERATURE: 97.4 F

## 2025-02-11 DIAGNOSIS — E28.2 PCOS (POLYCYSTIC OVARIAN SYNDROME): ICD-10-CM

## 2025-02-11 DIAGNOSIS — R76.9 ABNORMAL IMMUNOLOGICAL FINDING IN SERUM: ICD-10-CM

## 2025-02-11 DIAGNOSIS — R73.03 PREDIABETES: ICD-10-CM

## 2025-02-11 DIAGNOSIS — E78.5 HYPERLIPIDEMIA: ICD-10-CM

## 2025-02-11 DIAGNOSIS — K76.0 FATTY LIVER: ICD-10-CM

## 2025-02-11 DIAGNOSIS — E66.3 OVERWEIGHT: ICD-10-CM

## 2025-02-11 DIAGNOSIS — R74.8 ABNORMAL LIVER ENZYMES: ICD-10-CM

## 2025-02-11 DIAGNOSIS — Z71.89 VACCINE COUNSELING: ICD-10-CM

## 2025-02-11 DIAGNOSIS — Z79.899 HIGH RISK MEDICATION USE: ICD-10-CM

## 2025-02-11 DIAGNOSIS — Z72.820 SLEEP DEPRIVATION: ICD-10-CM

## 2025-02-11 DIAGNOSIS — R93.5 ABNORMAL US (ULTRASOUND) OF ABDOMEN: ICD-10-CM

## 2025-02-11 PROBLEM — 238131007: Status: ACTIVE | Noted: 2025-02-11

## 2025-02-11 PROCEDURE — 99215 OFFICE O/P EST HI 40 MIN: CPT

## 2025-02-11 RX ORDER — B-COMPLEX WITH VITAMIN C
AS DIRECTED TABLET ORAL
Status: ACTIVE | COMMUNITY

## 2025-02-11 RX ORDER — IBUPROFEN 800 MG/1
1 TABLET WITH FOOD OR MILK AS NEEDED TABLET ORAL
Status: DISCONTINUED | COMMUNITY

## 2025-02-11 RX ORDER — SPIRONOLACTONE 50 MG/1
3 TABLET TABLET ORAL ONCE A DAY
Qty: 90 TABLET | Status: ACTIVE | COMMUNITY

## 2025-02-11 RX ORDER — TIRZEPATIDE 7.5 MG/.5ML
0.5 ML INJECTION, SOLUTION SUBCUTANEOUS
Status: ACTIVE | COMMUNITY

## 2025-02-11 RX ORDER — CHOLECALCIFEROL (VITAMIN D3) 50 MCG
1 TABLET TABLET ORAL ONCE A DAY
Status: ACTIVE | COMMUNITY

## 2025-02-11 NOTE — HPI-TODAY'S VISIT:
Patient is a 49-year-old female last seen Aug 2024 and had been referred by Dr. Shaun Alonzo for evaluation of abnormal immunologic markers specifically smooth muscle antibody positive study in pt with suspected fatty liver and pcos.  A copy of the note will be sent to referring provider.  Switched to zepbound in dec 2024 and weight inc but driven by father illness in oct 2024 and  in . Pt was 10-12 pounds.   wbc 8.6 hg 14.2 and plate 419 chol 194 and hdl 68 and trg 142 and ldl 102 and ast 16 ad alt 17 and alk 53.   labs show us the SHARRI remains positive as it was last year in February as well (but titer was lower: discussed later in report). Glucose was 81 BUN of 11 creatinine 0.67 which was lower than last year. Sodium 137 potassium 4.3 chloride 102 and CO2 27 calcium 8.9. WBC count was 8.9 hemoglobin 13.4 MCV 94.9 plate count 374 with normal neutrophils and lymphocytes. Total protein was 6.7 albumin 4.2 bilirubin 0.7 with direct 0.1 so primarily still indirect bilirubin levels noted. Alk phos 37 which was lower than last year AST 13 and ALT of 8 show these labs are actually lower than August of last year but slightly higher than 2024 but still well within the normal in ideal range of an ALT of less than 25.   With regards to the SHARRI titer or level was only at a level of 40 which is right on the cusp of being a low antibody level and it was of what is called a nuclear homogeneous pattern which is a nonspecific pattern that could be associated with immune disorders such as lupus, drug-induced lupus and juvenile idiopathic arthritis but again it could also be a false positive. There was also an additional pattern seen called a nuclear speckled pattern which can be associated with other mixed connective tissue disease as well as other immune issues. Always important to be aware of these issues but many times these results when the values are low could be false positive.  2024 last year your titer was 80 and at that timeframe the pattern was a little different and it was nuclear dense fine speckled so as you can see the level and the patterns have been variable. About 40% of people with fatty liver can have these false positive immune markers and they continue to slowly overtime get better which we would hope by next year's labs that that may be the case given the trend.    ultrasound shows liver normal in contour and mildly increased in echogenicity with no suspicious lesions. No gallbladder wall thickening or pericholecystic fluid or stone seen and common bile duct normal at 2.5 mm. Pancreatic tail predominantly not seen due to gas but image pancreas was grossly unremarkable. Right kidney 10.8 cm with normal renal cortical thickness and echogenicity but no stones. No hydronephrosis. Spleen 9.1 cm which is unremarkable. Liver and splenic vessels were patent as expected. Overall they felt that you had mild steatosis and patent vessels.  Curiously your February of last year ultrasound had shown you to have a homogeneous/even liver with no clear fatty liver. Did anything change recently? Any recent illness that could have caused some issues with liver or change in status?  The stress of dad s illness and passing that impacted.  As for Dr Alonzo she is in monitoring and she is on sprinoloactone for the pcos and she is zepbound 7.5 mg once a week.  Weight stayed at 152 and stable and 162 and home 160.   labs showed total protein normal at 7.7 albumin 4.6 bilirubin 0.7 globin 0.17 alk phos 50 AST 15 ALT of 9 with ideal ALT less than 25 so liver labs are doing well. Please share with local providers.   labs glucose 84 and bun 15 and cr 0.91 and na 139 and k 4.4 and cl 101 and co2 29 and ca 10.0 and asma 35 elevated and total protein 7.5 and alb 4.5 and tb 0.9 and direct bili 0.2 and alk 38 and ast 11 and alt 8. Labs lower than prior aug ast 23 and alt 32.  Sharri positive.   ultrasound shows pancreas unremarkable. Liver homogeneous/even in echotexture with no discrete lesion. Liver vessels patent as expected. Gallbladder with no stones. Gallbladder otherwise unremarkable. Common bile duct 3 mm which is normal. Right kidney 10.5 cm with no hydronephrosis. Spleen 8.7 cm which is normal. Splenic vessels patent. Prior August ultrasound also showed liver was normal in echogenicity and spleen was 9.1 cm which is within the variability of this technique.  u.s  August 3 2023 ultrasound was sent to me.  The liver normal in echogenicity without lesions.  The bile ducts were not dilated.  The common duct is 4 mm and this is normal.  Pancreas was normal were seen.  The right and left kidneys appeared normal.  Spleen 9.1 cm and this is normal.  The hepatic vascular was patent.  Overall they felt this was a unremarkable abdominal ultrasound.  August 3 labs were sent to me.  Glucose 81, creatinine 0.89, sodium 136, potassium 4.3, bilirubin 0.5, alkaline phosphatase 47, AST 14, ALT 7.  White blood cells 8.4, red blood cells 4.3, hemoglobin 13.3, MCV 92, platelets 333 these are normal.    If you recall the MRI done in 2023 fat quantification was 10%.    2022 that showed increased echogenicity.    SHe says on wegovy and she is 2.4mg weekly and weight prior was at 145-148. She says target 145. Started  and 2023   Weight down 75 pounds.  A1c 4.6% and thinks it is in /  3/13/23 The complete blood count was normal. The Alkaline phosphatase 49, ast 13, alt 7. Previously ast 13, alt 11. Glad to see the las are staying low and stable.  23 MRI LIver with no lesions, mild fat. superimposed fat seen near samir hepatis. Fat quant 10%, Decompresssed GB. Spleen normal. Seperate origin of hepatic artery, originating from aorta. Their impression: Mild fat, no lesion  She has lost 8 more lbs. in March.  Aug 2023: went 220 to 152 now.    ultrasound showed liver to have an increased echogenicity.  There were likely focal sparing areas of fatty liver around the gallbladder fossa suspected. Bile duct showed no dilation and common bile duct was normal at 2.5 mm. Gallbladder showed a 2 x 3 x 4 mm echogenic focus in the gallbladder neck.  Slater sign was negative. Pancreas was unremarkable were seen. Right kidney was 12.5 cm and left kidney 11.8 cm with no hydronephrosis. Spleen was 10.7 cm. Liver vessels were patent. Overall increased echogenicity of the liver was seen due to fatty liver. The portal vein demonstrated normal directional flow. A 4 mm echogenic area was seen in the gallbladder neck which they thought could represent a stone versus a polyp. Possible focal fat sparing was seen around the gallbladder fossa area.  If one compares this ultrasound to your Elisa 15 ultrasound, it also showed diffuse parenchymal echogenic changes of the liver but no focal fat sparing lesions were suggested.  It did not mention any focal fat sparing, I do believe that this would warrant us to consider an MRI to get a better look and to confirm this is the case.  168 now and previously 198 and before that 220 and doing well more disipline and wathcing diet and less stress and more sleep  Labs ast 13, alt 11 and doing much better Patient is working dr awan on a monthly basis to help with the weight loss and is on ursodiol to help with the stones She is also on the wegovy with him and doing well  sleep was better too , not snoring now as well which is good to note    Elisa 15 ultrasound shows diffuse parenchymal echogenicity changes of liver but with no lesions. This would support a fatty liver as being likely present. No dilated bile ducts seen in common bile duct normal at 2 mm. Gallbladder was normal. Pancreas were visualized was unremarkable. Right kidney 12 cm left kidney 12.3 cm with no hydronephrosis. Spleen normal 11.3 cm. Liver vessels patent.  Still seeing Emperatriz. has lost 20 lbs on Saxenda. Encrouged.   Watching diet and exercise.  Dr. Awan did labs end of july and she said AST 21 and ALT 24 ,Alkaline phosphatase 52 She did labs for us yesterday through [facility]. Will try to get.   A1c: 5.6 in  prior to weight loss  Cholesterol: not checked recently. recommend getting at pcp   Sleep: sleeping much better.  May 16 labs show alpha-1 level normal at 147.  Ceruloplasmin normal at 28.5.  Hep C antibody negative at less than 0.1.   Hep A immunity not seen and has not done this. Hep B core total negative.  Hep B immunity was seen at 23.3. Hep B surface antigen negative.  Albumin 4.3 bilirubin 0.4 direct bilirubin 0.2 alkaline phosphatase 61. AST 39 and ALT 66 with ideal ALT less than 25.  Iron saturation normal at 16%.  Patient listed as being noted to have a history of goiter diagnosed in her 20s and being treated medications.   Noted to have now prediabetes with the last 2 years A1c at threshold of diabetes.    Polycystic ovary noted in the 20s.    Fatty liver noted in 2021.    Has been gaining weight for the last 5 years and has increased from 130-150 to 180 in 2017.  Peaking at 215 which is her current weight with endocrine and 218 here  Had failed other efforts to lose weight.    Noted to have high liver labs and indeterminate fib 4 score.    AST listed as 151  platelets 345.  A1c less than 5.7%.  Also with polycystic ovarian issues and not on tx and she had this with getting pregnancy.  She had 2 kids and last one in . dropped to 150 after and then took off 2017 to now.  2022 labs showed SHARRI speckled at a level of 160 homogeneous.  Prolactin normal at 18.1.  TSH 2.62.  DHEA morning.  Alpha-1 140.  CPK 80.  Serologic assay  inflammation grade of 0.67.  Suggestive of severe activity.   Cholesterol 211 triglyceride 84 HDL 57 glucose 111 BUN of 12 cr 0.4 sodium 139 potassium 4.6 calcium 9.6 albumin 4.5 globin 0.6 alkaline phosphatase 65   F-actin +37.8 ASMA level of 20.  Vitamin D 35.  Hep B core total negative.  Testosterone 37.  AMA negative at 2.7.  b12 slightly up at 951.  Folate normal 10.9 uric acid 5.2.  Hep C antibody negative.  White blood cell count 9.9 hemoglobin 13.6 platelet count 391 hemoglobin A1c 6.0.  B surface antigen negative.  Ferritin 114.  IgG normal at 1245 IgM normal at 68 IgA slightly up at 578.  This would suggest this is a low positive immune marker due to the fatty liver and not primary immune issues.  Hep B immunity seen but level not sent.  Patient listed on a recent visit of 2022 as having a weight of 218.6 height of five-point 5-1/4 and a BMI of 36.1.  Allergies listed as none.    Medicines include ibuprofen 800 mg up to every 8 hours as needed, B complex every day and vitamin D.  Not on any meds yet.  Past history includes abnormal immune marker, sleep deprivation, simple goiter, suspected nonalcoholic fatty liver, prediabetes, hyperlipidemia, polycystic ovary syndrome, obesity, hirsutism, asthma, gluten sensitivity, and fatigue.  Family history Father with glaucoma, macular degeneration, hyperlipidemia, hypertension.  Mother with congestive heart failure hyperlipidemia and hypertensive disorder.  Social history patient does have approximately 1-2 drinks a week and stopped.  Not a smoker.  2 kids.  Past surgical history  in 2008 and 2010.  Pt is on exercise and she says she walks 30 -40 min of walking.  Diet wise she is daily harvest and does smoothies and dinner with them.  Prior gave info re chen and med diets.  Plan. 1. See in 6m and being on diet and program and the med change things will settle and labs better and need the u,s in 6m to follow if that is better. 2. The sharri titer will check next year. 3. Pt will stay on stellar weight loss journey. 4. Tragedy of fathers passing imoacted.   Duration of the visit was 42 minutes with 10 minutes of chart prep and 32 minutes of face to face visit reviewing recent records, discussing their current status and the future plans for the patient.

## 2025-02-11 NOTE — EXAM-PHYSICAL EXAM
Gen: awake and responsive. Eyes: anicteric and normal lids. Mouth: normal lips. Nose: no drainage. Hearing: intact grossly. Neck: trachea midline and no jvd. CV: RRR no s3. Lungs: clear. No wheezes, Abd: Soft, nabs, nr, NT. Not able to feel liver or spleen. Ext: no sig edema, slight palm erythema. Neuro: moves all 4 ext grossly. No asterixis.

## 2025-03-12 ENCOUNTER — WEB ENCOUNTER (OUTPATIENT)
Dept: URBAN - METROPOLITAN AREA CLINIC 92 | Facility: CLINIC | Age: 50
End: 2025-03-12

## 2025-05-19 ENCOUNTER — TELEPHONE ENCOUNTER (OUTPATIENT)
Dept: URBAN - METROPOLITAN AREA MEDICAL CENTER 28 | Facility: MEDICAL CENTER | Age: 50
End: 2025-05-19

## 2025-07-29 ENCOUNTER — TELEPHONE ENCOUNTER (OUTPATIENT)
Dept: URBAN - METROPOLITAN AREA CLINIC 86 | Facility: CLINIC | Age: 50
End: 2025-07-29

## 2025-07-29 ENCOUNTER — LAB OUTSIDE AN ENCOUNTER (OUTPATIENT)
Dept: URBAN - METROPOLITAN AREA CLINIC 86 | Facility: CLINIC | Age: 50
End: 2025-07-29

## 2025-07-29 ENCOUNTER — OFFICE VISIT (OUTPATIENT)
Dept: URBAN - METROPOLITAN AREA CLINIC 91 | Facility: CLINIC | Age: 50
End: 2025-07-29
Payer: COMMERCIAL

## 2025-07-29 DIAGNOSIS — K76.0 FATTY LIVER: ICD-10-CM

## 2025-07-29 PROCEDURE — 93975 VASCULAR STUDY: CPT

## 2025-07-29 PROCEDURE — 76705 ECHO EXAM OF ABDOMEN: CPT

## 2025-07-29 RX ORDER — CHOLECALCIFEROL (VITAMIN D3) 50 MCG
1 TABLET TABLET ORAL ONCE A DAY
Status: ACTIVE | COMMUNITY

## 2025-07-29 RX ORDER — TIRZEPATIDE 7.5 MG/.5ML
0.5 ML INJECTION, SOLUTION SUBCUTANEOUS
Status: ACTIVE | COMMUNITY

## 2025-07-29 RX ORDER — B-COMPLEX WITH VITAMIN C
AS DIRECTED TABLET ORAL
Status: ACTIVE | COMMUNITY

## 2025-07-29 RX ORDER — SPIRONOLACTONE 50 MG/1
3 TABLET TABLET ORAL ONCE A DAY
Qty: 90 TABLET | Status: ACTIVE | COMMUNITY

## 2025-07-29 NOTE — HPI-TODAY'S VISIT:
Brian Marinelli,    July 29 ultrasound shows visualized portions of the pancreas appeared unremarkable.  Mildly increased hepatic parenchymal echogenicity seen with normal liver surface contour and no focal lesion.   Common bile duct was 2 mm.   Gallbladder was normal.   Peritoneum showed no significant ascites.   Right kidney was 10.2 cm with no hydronephrosis.   Liver vessels and splenic vessels were patent.   Spleen was normal at 9.1 cm.    In summary, they thought the liver still had a fatty appearing but with no suspicious lesions and patent vessels.  One new option we have is to consider doing a FibroScan now that we have access to the at the [place] office or at the [place] office or here [facility] which are the 3 probable closest locations to you to help us to look at this and clarify that issue of fat content further.    Dr Yanez

## 2025-07-30 ENCOUNTER — TELEPHONE ENCOUNTER (OUTPATIENT)
Dept: URBAN - METROPOLITAN AREA CLINIC 86 | Facility: CLINIC | Age: 50
End: 2025-07-30

## 2025-07-30 LAB
A/G RATIO: 1.5
ABSOLUTE BASOPHILS: 53
ABSOLUTE EOSINOPHILS: 123
ABSOLUTE LYMPHOCYTES: 2165
ABSOLUTE MONOCYTES: 686
ABSOLUTE NEUTROPHILS: 5773
ALBUMIN: 4.8
ALKALINE PHOSPHATASE: 47
ALT (SGPT): 8
AST (SGOT): 13
BASOPHILS: 0.6
BILIRUBIN, TOTAL: 0.7
BUN/CREATININE RATIO: (no result)
BUN: 10
CALCIUM: 10.5
CARBON DIOXIDE, TOTAL: 28
CHLORIDE: 99
CREATININE: 0.91
EGFR: 78
EOSINOPHILS: 1.4
GLOBULIN, TOTAL: 3.1
GLUCOSE: 80
HEMATOCRIT: 45.2
HEMOGLOBIN: 14.8
LYMPHOCYTES: 24.6
MCH: 30.2
MCHC: 32.7
MCV: 92.2
MONOCYTES: 7.8
MPV: 10.2
NEUTROPHILS: 65.6
PLATELET COUNT: 421
POTASSIUM: 4.9
PROTEIN, TOTAL: 7.9
RDW: 12.4
RED BLOOD CELL COUNT: 4.9
SODIUM: 136
WHITE BLOOD CELL COUNT: 8.8

## 2025-07-30 NOTE — HPI-TODAY'S VISIT:
Dear Maribell Marinelli,   July 29 labs: wbc 8.8 and hg 14.8 normal. HCT 45.2 elevated and prior normal so wonder if you were a little "dry" for the labs.   Platelets 421 little up also from 374.   Neutrophils 5773 and lymphs 2165 normal.   Glucose 80 and bun 10 and cr 0.91 and na 136 and k 4.9 and cl 99 and co2 28 and calcium little up also 10.5 (maybe dry again as cause or have you been on vit d/calcium supplements).   Total protein 7.9 and alb 4.8 and tb 0.7 and alk 47 and ast 13 and alt 8.Prior ast 14 and alt 7 so about the same and ideal alt less than 25.   Dr Yanez

## 2025-08-01 ENCOUNTER — LAB OUTSIDE AN ENCOUNTER (OUTPATIENT)
Dept: URBAN - METROPOLITAN AREA CLINIC 86 | Facility: CLINIC | Age: 50
End: 2025-08-01

## 2025-08-15 ENCOUNTER — OFFICE VISIT (OUTPATIENT)
Dept: URBAN - METROPOLITAN AREA TELEHEALTH 2 | Facility: TELEHEALTH | Age: 50
End: 2025-08-15
Payer: COMMERCIAL

## 2025-08-15 ENCOUNTER — LAB OUTSIDE AN ENCOUNTER (OUTPATIENT)
Dept: URBAN - METROPOLITAN AREA TELEHEALTH 2 | Facility: TELEHEALTH | Age: 50
End: 2025-08-15

## 2025-08-15 DIAGNOSIS — R73.03 PREDIABETES: ICD-10-CM

## 2025-08-15 DIAGNOSIS — Z71.89 VACCINE COUNSELING: ICD-10-CM

## 2025-08-15 DIAGNOSIS — E28.2 PCOS (POLYCYSTIC OVARIAN SYNDROME): ICD-10-CM

## 2025-08-15 DIAGNOSIS — K76.0 FATTY LIVER: ICD-10-CM

## 2025-08-15 DIAGNOSIS — E66.3 OVERWEIGHT: ICD-10-CM

## 2025-08-15 DIAGNOSIS — E78.5 HYPERLIPIDEMIA: ICD-10-CM

## 2025-08-15 DIAGNOSIS — R76.9 ABNORMAL IMMUNOLOGICAL FINDING IN SERUM: ICD-10-CM

## 2025-08-15 DIAGNOSIS — R74.8 ABNORMAL LIVER ENZYMES: ICD-10-CM

## 2025-08-15 DIAGNOSIS — Z72.820 SLEEP DEPRIVATION: ICD-10-CM

## 2025-08-15 DIAGNOSIS — R93.5 ABNORMAL US (ULTRASOUND) OF ABDOMEN: ICD-10-CM

## 2025-08-15 DIAGNOSIS — Z79.899 HIGH RISK MEDICATION USE: ICD-10-CM

## 2025-08-15 PROCEDURE — 99214 OFFICE O/P EST MOD 30 MIN: CPT

## 2025-08-15 RX ORDER — TIRZEPATIDE 15 MG/.5ML
0.5 ML INJECTION, SOLUTION SUBCUTANEOUS WEEKLY
Status: ACTIVE | COMMUNITY

## 2025-08-15 RX ORDER — SPIRONOLACTONE 50 MG/1
3 TABLET TABLET ORAL ONCE A DAY
Qty: 90 TABLET | Status: ACTIVE | COMMUNITY

## 2025-08-15 RX ORDER — CHOLECALCIFEROL (VITAMIN D3) 50 MCG
1 TABLET TABLET ORAL ONCE A DAY
Status: ACTIVE | COMMUNITY

## 2025-08-15 RX ORDER — B-COMPLEX WITH VITAMIN C
AS DIRECTED TABLET ORAL
Status: ACTIVE | COMMUNITY

## 2025-08-26 ENCOUNTER — WEB ENCOUNTER (OUTPATIENT)
Dept: URBAN - METROPOLITAN AREA CLINIC 92 | Facility: CLINIC | Age: 50
End: 2025-08-26